# Patient Record
Sex: MALE | Race: ASIAN | Employment: FULL TIME | URBAN - METROPOLITAN AREA
[De-identification: names, ages, dates, MRNs, and addresses within clinical notes are randomized per-mention and may not be internally consistent; named-entity substitution may affect disease eponyms.]

---

## 2017-05-11 ENCOUNTER — OFFICE VISIT (OUTPATIENT)
Dept: INTERNAL MEDICINE CLINIC | Facility: CLINIC | Age: 37
End: 2017-05-11

## 2017-05-11 VITALS
HEART RATE: 87 BPM | DIASTOLIC BLOOD PRESSURE: 68 MMHG | SYSTOLIC BLOOD PRESSURE: 118 MMHG | BODY MASS INDEX: 21.9 KG/M2 | WEIGHT: 153 LBS | RESPIRATION RATE: 16 BRPM | HEIGHT: 70 IN

## 2017-05-11 DIAGNOSIS — M72.2 PLANTAR FASCIITIS: Primary | ICD-10-CM

## 2017-05-11 PROCEDURE — 99212 OFFICE O/P EST SF 10 MIN: CPT | Performed by: INTERNAL MEDICINE

## 2017-05-11 PROCEDURE — 99213 OFFICE O/P EST LOW 20 MIN: CPT | Performed by: INTERNAL MEDICINE

## 2017-05-11 RX ORDER — NAPROXEN 500 MG/1
500 TABLET ORAL 2 TIMES DAILY WITH MEALS
Qty: 30 TABLET | Refills: 1 | Status: SHIPPED | OUTPATIENT
Start: 2017-05-11 | End: 2018-05-06

## 2017-05-11 NOTE — PROGRESS NOTES
Christopher Brown is a 31year old male. Patient presents with:  Pain: right heel    HPI:   For the past 2 months, he has had persistent pain in the plantar aspect of his right heel. Pain is worse with walking, and he finds that he occasionally limps.   He recalls agrees to the plan. The patient is asked to return as needed.     Ellery Aschoff, MD  5/11/2017  9:20 AM

## 2017-05-11 NOTE — PATIENT INSTRUCTIONS
Please rest and apply ice to your right heel 3 times daily. Please perform gentle stretching exercises, and wear arch supports in your shoes. Please take naproxen 500 mg twice daily with meals. Call if not soon better.

## 2018-05-17 ENCOUNTER — OFFICE VISIT (OUTPATIENT)
Dept: INTERNAL MEDICINE CLINIC | Facility: CLINIC | Age: 38
End: 2018-05-17

## 2018-05-17 VITALS
SYSTOLIC BLOOD PRESSURE: 126 MMHG | HEART RATE: 83 BPM | HEIGHT: 70 IN | WEIGHT: 157.88 LBS | BODY MASS INDEX: 22.6 KG/M2 | DIASTOLIC BLOOD PRESSURE: 78 MMHG

## 2018-05-17 DIAGNOSIS — S16.1XXA STRAIN OF NECK MUSCLE, INITIAL ENCOUNTER: Primary | ICD-10-CM

## 2018-05-17 PROCEDURE — 99212 OFFICE O/P EST SF 10 MIN: CPT | Performed by: INTERNAL MEDICINE

## 2018-05-17 PROCEDURE — 99213 OFFICE O/P EST LOW 20 MIN: CPT | Performed by: INTERNAL MEDICINE

## 2018-05-17 RX ORDER — TRAMADOL HYDROCHLORIDE 50 MG/1
TABLET ORAL
Qty: 30 TABLET | Refills: 0 | Status: SHIPPED
Start: 2018-05-17 | End: 2018-11-07

## 2018-05-17 RX ORDER — COVID-19 ANTIGEN TEST
220 KIT MISCELLANEOUS EVERY EVENING
Qty: 60 CAPSULE | Refills: 0 | OUTPATIENT
Start: 2018-05-17 | End: 2018-06-16

## 2018-05-17 RX ORDER — CYCLOBENZAPRINE HCL 10 MG
10 TABLET ORAL 3 TIMES DAILY
Qty: 30 TABLET | Refills: 1 | Status: SHIPPED | OUTPATIENT
Start: 2018-05-17 | End: 2018-06-06

## 2018-05-17 NOTE — ASSESSMENT & PLAN NOTE
Continue Aleve daily. Add Flexeril. Tramadol prn. Neck exercises. PT if no improvement in 1 week. f/u in 2 weeks.

## 2018-05-17 NOTE — PATIENT INSTRUCTIONS
Take Aleve every evening. Take Flexeril if needed. Take tramadol only if the other 2 meds don't help. Murphys (Flexibility)    1. Lie on your back on the floor, with your knees bent and your feet flat on the floor.   2. Gently flatten your neck toward · Raise both of your shoulders as high as you can, as if you were trying to touch them to your ears. Keep your head and neck still and relaxed. · Hold for a count of 10. Release. · Repeat 5 times.   For your safety, check with your healthcare provider bef

## 2018-05-17 NOTE — PROGRESS NOTES
HPI:    Patient ID: Shaheen Lakhani is a 30year old male. Pt woke with neck/shoulder pain. Neck Pain    This is a new problem. The current episode started 1 to 4 weeks ago. The problem occurs daily. The problem has been waxing and waning.  The pain is pre well-developed and well-nourished. HENT:   Head: Normocephalic and atraumatic. Cardiovascular: Normal rate, regular rhythm and normal heart sounds. Pulmonary/Chest: Effort normal. No respiratory distress.    Musculoskeletal:        Cervical back: He

## 2018-11-04 ENCOUNTER — LAB SERVICES (OUTPATIENT)
Dept: OTHER | Age: 38
End: 2018-11-04

## 2018-11-04 LAB
ANALYZER ANC (IANC): ABNORMAL
ANALYZER ANC (IANC): ABNORMAL
ANION GAP SERPL CALC-SCNC: 12 MMOL/L (ref 10–20)
ANION GAP SERPL CALC-SCNC: 12 MMOL/L (ref 10–20)
BASOPHILS # BLD: 0 K/MCL (ref 0–0.3)
BASOPHILS # BLD: 0 THOUSAND/MCL (ref 0–0.3)
BASOPHILS NFR BLD: 0 %
BASOPHILS NFR BLD: 0 %
BUN SERPL-MCNC: 16 MG/DL (ref 6–20)
BUN SERPL-MCNC: 16 MG/DL (ref 6–20)
BUN/CREAT SERPL: 14 (ref 7–25)
BUN/CREAT SERPL: 14 (ref 7–25)
CALCIUM SERPL-MCNC: 8 MG/DL (ref 8.4–10.2)
CALCIUM SERPL-MCNC: 8 MG/DL (ref 8.4–10.2)
CHLORIDE SERPL-SCNC: 105 MMOL/L (ref 98–107)
CHLORIDE: 105 MMOL/L (ref 98–107)
CO2 SERPL-SCNC: 28 MMOL/L (ref 21–32)
CO2 SERPL-SCNC: 28 MMOL/L (ref 21–32)
CREAT SERPL-MCNC: 1.15 MG/DL (ref 0.67–1.17)
CREAT SERPL-MCNC: 1.15 MG/DL (ref 0.67–1.17)
D DIMER PPP FEU-MCNC: <0.19 MG/L (FEU)
D DIMER PPP FEU-MCNC: <0.19 MG/L FEU
DIFFERENTIAL METHOD BLD: ABNORMAL
DIFFERENTIAL METHOD BLD: ABNORMAL
EOSINOPHIL # BLD: 0.1 K/MCL (ref 0.1–0.5)
EOSINOPHIL # BLD: 0.1 THOUSAND/MCL (ref 0.1–0.5)
EOSINOPHIL NFR BLD: 1 %
EOSINOPHIL NFR BLD: 1 %
ERYTHROCYTE [DISTWIDTH] IN BLOOD: 12.6 % (ref 11–15)
ERYTHROCYTE [DISTWIDTH] IN BLOOD: 12.6 % (ref 11–15)
GLUCOSE SERPL-MCNC: 151 MG/DL (ref 65–99)
GLUCOSE SERPL-MCNC: 151 MG/DL (ref 65–99)
HEMATOCRIT: 48.4 % (ref 39–51)
HEMATOCRIT: 48.4 % (ref 39–51)
HEMOGLOBIN: 15.4 G/DL (ref 13–17)
HGB BLD-MCNC: 15.4 GM/DL (ref 13–17)
IMM GRANULOCYTES # BLD AUTO: 0 K/MCL (ref 0–0.2)
IMM GRANULOCYTES # BLD AUTO: 0 THOUSAND/MCL (ref 0–0.2)
IMM GRANULOCYTES NFR BLD: 0 %
IMM GRANULOCYTES NFR BLD: 0 %
LYMPHOCYTES # BLD: 2.6 K/MCL (ref 1–4.8)
LYMPHOCYTES # BLD: 2.6 THOUSAND/MCL (ref 1–4.8)
LYMPHOCYTES NFR BLD: 36 %
LYMPHOCYTES NFR BLD: 36 %
MCH RBC QN AUTO: 28.7 PG (ref 26–34)
MCHC RBC AUTO-ENTMCNC: 31.8 GM/DL (ref 32–36.5)
MCV RBC AUTO: 90.3 FL (ref 78–100)
MEAN CORPUSCULAR HEMOGLOBIN: 28.7 PG (ref 26–34)
MEAN CORPUSCULAR HGB CONC: 31.8 G/DL (ref 32–36.5)
MEAN CORPUSCULAR VOLUME: 90.3 FL (ref 78–100)
MONOCYTES # BLD: 0.8 K/MCL (ref 0.3–0.9)
MONOCYTES # BLD: 0.8 THOUSAND/MCL (ref 0.3–0.9)
MONOCYTES NFR BLD: 11 %
MONOCYTES NFR BLD: 11 %
NEUTROPHILS # BLD: 3.8 K/MCL (ref 1.8–7.7)
NEUTROPHILS # BLD: 3.8 THOUSAND/MCL (ref 1.8–7.7)
NEUTROPHILS NFR BLD: 52 %
NEUTROPHILS NFR BLD: 52 %
NEUTS SEG NFR BLD: ABNORMAL
NEUTS SEG NFR BLD: ABNORMAL %
NRBC (NRBCRE): 0 /100 WBC
NRBC (NRBCRE): 0 /100 WBC
PLATELET # BLD: 210 THOUSAND/MCL (ref 140–450)
PLATELET COUNT: 210 K/MCL (ref 140–450)
POTASSIUM SERPL-SCNC: 3.8 MMOL/L (ref 3.4–5.1)
POTASSIUM SERPL-SCNC: 3.8 MMOL/L (ref 3.4–5.1)
RBC # BLD: 5.36 MILLION/MCL (ref 4.5–5.9)
RED CELL COUNT: 5.36 MIL/MCL (ref 4.5–5.9)
SODIUM SERPL-SCNC: 141 MMOL/L (ref 135–145)
SODIUM SERPL-SCNC: 141 MMOL/L (ref 135–145)
T4 FREE SERPL-MCNC: 1.1 NG/DL (ref 0.8–1.5)
TROPONIN I SERPL HS-MCNC: <0.02 NG/ML
TROPONIN I SERPL HS-MCNC: <0.02 NG/ML
TSH SERPL-ACNC: 5.21 MCUNIT/ML (ref 0.35–5)
TSH SERPL-ACNC: 5.21 MCUNITS/ML (ref 0.35–5)
WBC # BLD: 7.3 THOUSAND/MCL (ref 4.2–11)
WHITE BLOOD COUNT: 7.3 K/MCL (ref 4.2–11)

## 2018-11-05 ENCOUNTER — HOSPITAL (OUTPATIENT)
Dept: OTHER | Age: 38
End: 2018-11-05
Attending: INTERNAL MEDICINE

## 2018-11-05 ENCOUNTER — IMAGING SERVICES (OUTPATIENT)
Dept: OTHER | Age: 38
End: 2018-11-05

## 2018-11-05 ENCOUNTER — DIAGNOSTIC TRANS (OUTPATIENT)
Dept: OTHER | Age: 38
End: 2018-11-05

## 2018-11-05 LAB
GLUCOSE BLDC GLUCOMTR-MCNC: 107 MG/DL (ref 65–99)
GLUCOSE BLDC GLUCOMTR-MCNC: 108 MG/DL (ref 65–99)
GLUCOSE BLDC GLUCOMTR-MCNC: 71 MG/DL (ref 65–99)
GLYCOHEMOGLOBIN: 5.3 % (ref 4.5–5.6)
T4 FREE SERPL-MCNC: 1.1 NG/DL (ref 0.8–1.5)

## 2018-11-07 ENCOUNTER — OFFICE VISIT (OUTPATIENT)
Dept: INTERNAL MEDICINE CLINIC | Facility: CLINIC | Age: 38
End: 2018-11-07
Payer: COMMERCIAL

## 2018-11-07 VITALS
HEART RATE: 90 BPM | WEIGHT: 155 LBS | DIASTOLIC BLOOD PRESSURE: 78 MMHG | RESPIRATION RATE: 18 BRPM | SYSTOLIC BLOOD PRESSURE: 124 MMHG | TEMPERATURE: 98 F | BODY MASS INDEX: 22.19 KG/M2 | HEIGHT: 70 IN

## 2018-11-07 DIAGNOSIS — R00.2 PALPITATIONS: Primary | ICD-10-CM

## 2018-11-07 PROCEDURE — 99213 OFFICE O/P EST LOW 20 MIN: CPT | Performed by: INTERNAL MEDICINE

## 2018-11-07 PROCEDURE — 1111F DSCHRG MED/CURRENT MED MERGE: CPT | Performed by: INTERNAL MEDICINE

## 2018-11-07 PROCEDURE — 99212 OFFICE O/P EST SF 10 MIN: CPT | Performed by: INTERNAL MEDICINE

## 2018-11-07 NOTE — PROGRESS NOTES
Asim Pantoja is a 30year old male. Patient presents with:  Hospital F/U    HPI:   Ming Fili presents this afternoon for hospital follow-up.     He was at a friend's house on November 4 when he had 3 distinct episodes of palpitations, feeling symptoms of flushing, Resp 18   Ht 5' 10\" (1.778 m)   Wt 155 lb (70.3 kg)   BMI 22.24 kg/m²   LUNGS: Resonant to percussion and clear to auscultation  CARDIAC: Rhythm regular S1 S2 normal without murmur, JVD or edema  ABDOMEN: Bowel sounds normal soft nontender      ASSESSMEN

## 2018-11-07 NOTE — PATIENT INSTRUCTIONS
Purchase a device to access deann for an event monitor on your smart phone and follow-up with Dr. Danny Mathew as advised

## 2018-11-29 ENCOUNTER — MED REC SCAN ONLY (OUTPATIENT)
Dept: INTERNAL MEDICINE CLINIC | Facility: CLINIC | Age: 38
End: 2018-11-29

## 2018-12-20 ENCOUNTER — MED REC SCAN ONLY (OUTPATIENT)
Dept: CARDIOLOGY CLINIC | Facility: CLINIC | Age: 38
End: 2018-12-20

## 2018-12-20 ENCOUNTER — OFFICE VISIT (OUTPATIENT)
Dept: CARDIOLOGY CLINIC | Facility: CLINIC | Age: 38
End: 2018-12-20
Payer: COMMERCIAL

## 2018-12-20 VITALS
WEIGHT: 149 LBS | HEIGHT: 70 IN | TEMPERATURE: 99 F | SYSTOLIC BLOOD PRESSURE: 128 MMHG | DIASTOLIC BLOOD PRESSURE: 90 MMHG | HEART RATE: 81 BPM | BODY MASS INDEX: 21.33 KG/M2

## 2018-12-20 DIAGNOSIS — I47.1 SVT (SUPRAVENTRICULAR TACHYCARDIA) (HCC): Primary | ICD-10-CM

## 2018-12-20 DIAGNOSIS — R42 DIZZINESS: ICD-10-CM

## 2018-12-20 PROBLEM — I47.10 SVT (SUPRAVENTRICULAR TACHYCARDIA) (HCC): Status: ACTIVE | Noted: 2018-12-20

## 2018-12-20 PROBLEM — I47.10 SVT (SUPRAVENTRICULAR TACHYCARDIA): Status: ACTIVE | Noted: 2018-12-20

## 2018-12-20 PROCEDURE — 99212 OFFICE O/P EST SF 10 MIN: CPT | Performed by: INTERNAL MEDICINE

## 2018-12-20 PROCEDURE — 99245 OFF/OP CONSLTJ NEW/EST HI 55: CPT | Performed by: INTERNAL MEDICINE

## 2018-12-20 NOTE — H&P
[facility]    Cardiac Electrophysiology Consultation  2018    Name:  Elvia Shepherd  : 1980    Date of consultation:   2018    Referring physician: Dr. Charissa Jordan    Reason for Consultation:  tachycardia    History of Present Illness:  Pura Britton Review of Systems:  GENERAL: no fevers, chills, sweats  HEENT: no visual or hearing changes  SKIN: denies any unusual skin lesions or rashes  RESPIRATORY: denies shortness of breath with exertion  CARDIOVASCULAR: no chest pain  GI: denies abdominal kervin 9.6 07/23/2015    OSMOCALC 288 07/23/2015    AST 24 07/23/2015    ALT 23 07/23/2015    ALKPHOS 54 07/23/2015    BILT 1.0 07/23/2015    TP 7.5 07/23/2015    ALB 4.3 07/23/2015    GLOBULIN 3.2 07/23/2015    AGRATIO 1.3 07/23/2015     07/23/2015    K 3.

## 2018-12-21 ENCOUNTER — TELEPHONE (OUTPATIENT)
Dept: CARDIOLOGY CLINIC | Facility: CLINIC | Age: 38
End: 2018-12-21

## 2018-12-21 DIAGNOSIS — I47.10 SVT (SUPRAVENTRICULAR TACHYCARDIA): Primary | ICD-10-CM

## 2018-12-21 DIAGNOSIS — R00.2 PALPITATION: ICD-10-CM

## 2018-12-21 NOTE — TELEPHONE ENCOUNTER
Pt calling to advise office that he would like to proceed with the ablasion procedure that was discussed in office yesterday, pls call at:[phone],thanks.

## 2018-12-21 NOTE — TELEPHONE ENCOUNTER
SVT Ablation by Dr. Sienna Morales 1/8, 1/11 or 1/14 TBD    D/t SVT, palpitations    Patient chose 1/8/19. Scheduled. Following up w/ Dr. Sienna Morales 1/3 re: anesthia and navigation.

## 2018-12-21 NOTE — TELEPHONE ENCOUNTER
Reviewed AG LOV. Called patient left brief message that we will call Wednesday to have ablation scheduled.

## 2018-12-24 NOTE — TELEPHONE ENCOUNTER
Patient scheduled for svt ablation 1-8-19 . Does patient need anesthesia? And do you need any navigation systems?   Please sign orders for pre admission testing

## 2018-12-24 NOTE — H&P
[facility]    Cardiac Electrophysiology H&P Addendum    Korey Hanley Location:Cath Lab Suites   [facility] [id number] MRN [id number]   Admission Date 1/8/2019 Procedure Date 1/8/2019   Attending Physician Stephanie Dutta MD Procedure Physician Lizzy Marino

## 2018-12-31 ENCOUNTER — TELEPHONE (OUTPATIENT)
Dept: CARDIOLOGY CLINIC | Facility: CLINIC | Age: 38
End: 2018-12-31

## 2018-12-31 ENCOUNTER — APPOINTMENT (OUTPATIENT)
Dept: LAB | Facility: HOSPITAL | Age: 38
End: 2018-12-31
Attending: NURSE PRACTITIONER
Payer: COMMERCIAL

## 2018-12-31 ENCOUNTER — HOSPITAL ENCOUNTER (OUTPATIENT)
Dept: GENERAL RADIOLOGY | Facility: HOSPITAL | Age: 38
Discharge: HOME | End: 2018-12-31
Attending: NURSE PRACTITIONER
Payer: COMMERCIAL

## 2018-12-31 DIAGNOSIS — R00.2 PALPITATION: ICD-10-CM

## 2018-12-31 DIAGNOSIS — I47.1 SVT (SUPRAVENTRICULAR TACHYCARDIA) (HCC): Primary | ICD-10-CM

## 2018-12-31 DIAGNOSIS — I47.1 SVT (SUPRAVENTRICULAR TACHYCARDIA) (HCC): ICD-10-CM

## 2018-12-31 LAB
ANION GAP SERPL CALC-SCNC: 9 MMOL/L (ref 0–18)
BUN SERPL-MCNC: 11 MG/DL (ref 8–20)
BUN/CREAT SERPL: 8.9 (ref 10–20)
CALCIUM SERPL-MCNC: 9.6 MG/DL (ref 8.5–10.5)
CHLORIDE SERPL-SCNC: 100 MMOL/L (ref 95–110)
CO2 SERPL-SCNC: 28 MMOL/L (ref 22–32)
CREAT SERPL-MCNC: 1.24 MG/DL (ref 0.5–1.5)
ERYTHROCYTE [DISTWIDTH] IN BLOOD BY AUTOMATED COUNT: 13 % (ref 11–15)
GLUCOSE SERPL-MCNC: 86 MG/DL (ref 70–99)
HCT VFR BLD AUTO: 46.5 % (ref 41–52)
HGB BLD-MCNC: 15.1 G/DL (ref 13.5–17.5)
MAGNESIUM SERPL-MCNC: 2.1 MG/DL (ref 1.8–2.5)
MCH RBC QN AUTO: 28.9 PG (ref 27–32)
MCHC RBC AUTO-ENTMCNC: 32.5 G/DL (ref 32–37)
MCV RBC AUTO: 88.8 FL (ref 80–100)
OSMOLALITY UR CALC.SUM OF ELEC: 283 MOSM/KG (ref 275–295)
PLATELET # BLD AUTO: 199 K/UL (ref 140–400)
PMV BLD AUTO: 8.9 FL (ref 7.4–10.3)
POTASSIUM SERPL-SCNC: 4.2 MMOL/L (ref 3.3–5.1)
RBC # BLD AUTO: 5.24 M/UL (ref 4.5–5.9)
SODIUM SERPL-SCNC: 137 MMOL/L (ref 136–144)
WBC # BLD AUTO: 4.5 K/UL (ref 4–11)

## 2018-12-31 PROCEDURE — 80048 BASIC METABOLIC PNL TOTAL CA: CPT | Performed by: NURSE PRACTITIONER

## 2018-12-31 PROCEDURE — 93010 ELECTROCARDIOGRAM REPORT: CPT | Performed by: NURSE PRACTITIONER

## 2018-12-31 PROCEDURE — 83735 ASSAY OF MAGNESIUM: CPT | Performed by: NURSE PRACTITIONER

## 2018-12-31 PROCEDURE — 36415 COLL VENOUS BLD VENIPUNCTURE: CPT | Performed by: NURSE PRACTITIONER

## 2018-12-31 PROCEDURE — 71046 X-RAY EXAM CHEST 2 VIEWS: CPT | Performed by: NURSE PRACTITIONER

## 2018-12-31 PROCEDURE — 93005 ELECTROCARDIOGRAM TRACING: CPT

## 2018-12-31 PROCEDURE — 85027 COMPLETE CBC AUTOMATED: CPT | Performed by: NURSE PRACTITIONER

## 2018-12-31 NOTE — TELEPHONE ENCOUNTER
S/w Magdalene Godfrey, states he wanted Dr. Nahid Rain to know he was still having SVT, even went to the ER at [facility] in [place]. Reviewed visit note from 'dr. Nahid Rain, no mention of medication options,  Ablation planned for 1/8/19.  Patient continues to do vagal luciana

## 2018-12-31 NOTE — TELEPHONE ENCOUNTER
Pt calling to advise that all last week he had at least one SVT episode a day, asking if he should be put on any rx prior to yarely procedure Jan 8, pls call at:[phone],thanks.

## 2019-01-02 NOTE — TELEPHONE ENCOUNTER
Ablation 1/8/19, per [facility] automated phone call no prior auth needed for out pt  Services. Reference #[id number]. Unable to verify on line as problem with site.

## 2019-01-03 NOTE — TELEPHONE ENCOUNTER
Reviewed with Dr. Dominic Victoria. He does not want to order any medication for him, nothing further until Ablation scheduled for 1/8.

## 2019-01-04 NOTE — TELEPHONE ENCOUNTER
On hold for 25 mins. S/w milena at [facility], no prior auth needed. Ref # [id number]. S/W Dennis Ibarra and informed of above.

## 2019-01-08 ENCOUNTER — HOSPITAL ENCOUNTER (OUTPATIENT)
Dept: INTERVENTIONAL RADIOLOGY/VASCULAR | Facility: HOSPITAL | Age: 39
Discharge: HOME | End: 2019-01-08
Attending: INTERNAL MEDICINE | Admitting: INTERNAL MEDICINE
Payer: COMMERCIAL

## 2019-01-08 VITALS
SYSTOLIC BLOOD PRESSURE: 117 MMHG | RESPIRATION RATE: 16 BRPM | HEART RATE: 68 BPM | BODY MASS INDEX: 20.76 KG/M2 | HEIGHT: 70 IN | OXYGEN SATURATION: 99 % | WEIGHT: 145 LBS | DIASTOLIC BLOOD PRESSURE: 72 MMHG

## 2019-01-08 DIAGNOSIS — I47.1 SVT (SUPRAVENTRICULAR TACHYCARDIA) (HCC): ICD-10-CM

## 2019-01-08 DIAGNOSIS — R00.2 PALPITATIONS: ICD-10-CM

## 2019-01-08 PROCEDURE — 93623 PRGRMD STIMJ&PACG IV RX NFS: CPT

## 2019-01-08 PROCEDURE — 02K83ZZ MAP CONDUCTION MECHANISM, PERCUTANEOUS APPROACH: ICD-10-PCS | Performed by: INTERNAL MEDICINE

## 2019-01-08 PROCEDURE — 36415 COLL VENOUS BLD VENIPUNCTURE: CPT

## 2019-01-08 PROCEDURE — 99152 MOD SED SAME PHYS/QHP 5/>YRS: CPT

## 2019-01-08 PROCEDURE — 99153 MOD SED SAME PHYS/QHP EA: CPT

## 2019-01-08 PROCEDURE — 93620 COMP EP EVL R AT VEN PAC&REC: CPT

## 2019-01-08 PROCEDURE — 93609 INTRA-VNTR MAPG TCHYCAR SITE: CPT

## 2019-01-08 PROCEDURE — 4A0234Z MEASUREMENT OF CARDIAC ELECTRICAL ACTIVITY, PERCUTANEOUS APPROACH: ICD-10-PCS | Performed by: INTERNAL MEDICINE

## 2019-01-08 PROCEDURE — 4A023FZ MEASUREMENT OF CARDIAC RHYTHM, PERCUTANEOUS APPROACH: ICD-10-PCS | Performed by: INTERNAL MEDICINE

## 2019-01-08 PROCEDURE — 96360 HYDRATION IV INFUSION INIT: CPT

## 2019-01-08 RX ORDER — SODIUM CHLORIDE 0.9 % (FLUSH) 0.9 %
10 SYRINGE (ML) INJECTION AS NEEDED
Status: DISCONTINUED | OUTPATIENT
Start: 2019-01-08 | End: 2019-01-08

## 2019-01-08 RX ORDER — LIDOCAINE HYDROCHLORIDE 20 MG/ML
INJECTION, SOLUTION EPIDURAL; INFILTRATION; INTRACAUDAL; PERINEURAL
Status: COMPLETED
Start: 2019-01-08 | End: 2019-01-08

## 2019-01-08 RX ORDER — SODIUM CHLORIDE 9 MG/ML
INJECTION, SOLUTION INTRAVENOUS
Status: DISCONTINUED
Start: 2019-01-08 | End: 2019-01-08

## 2019-01-08 RX ORDER — ACETAMINOPHEN AND CODEINE PHOSPHATE 300; 30 MG/1; MG/1
2 TABLET ORAL EVERY 4 HOURS PRN
Status: DISCONTINUED | OUTPATIENT
Start: 2019-01-08 | End: 2019-01-08

## 2019-01-08 RX ORDER — MIDAZOLAM HYDROCHLORIDE 1 MG/ML
INJECTION INTRAMUSCULAR; INTRAVENOUS
Status: COMPLETED
Start: 2019-01-08 | End: 2019-01-08

## 2019-01-08 RX ORDER — HEPARIN SODIUM 1000 [USP'U]/ML
INJECTION, SOLUTION INTRAVENOUS; SUBCUTANEOUS
Status: COMPLETED
Start: 2019-01-08 | End: 2019-01-08

## 2019-01-08 RX ORDER — ISOPROTERENOL HYDROCHLORIDE 0.2 MG/ML
INJECTION, SOLUTION INTRAMUSCULAR; INTRAVENOUS
Status: COMPLETED
Start: 2019-01-08 | End: 2019-01-08

## 2019-01-08 RX ORDER — SODIUM CHLORIDE 9 MG/ML
INJECTION, SOLUTION INTRAVENOUS CONTINUOUS
Status: DISCONTINUED | OUTPATIENT
Start: 2019-01-08 | End: 2019-01-08

## 2019-01-08 RX ORDER — ACETAMINOPHEN AND CODEINE PHOSPHATE 300; 30 MG/1; MG/1
1 TABLET ORAL EVERY 4 HOURS PRN
Status: DISCONTINUED | OUTPATIENT
Start: 2019-01-08 | End: 2019-01-08

## 2019-01-08 RX ORDER — ACETAMINOPHEN 325 MG/1
650 TABLET ORAL EVERY 4 HOURS PRN
Status: DISCONTINUED | OUTPATIENT
Start: 2019-01-08 | End: 2019-01-08

## 2019-01-08 RX ORDER — SODIUM CHLORIDE 9 MG/ML
INJECTION, SOLUTION INTRAVENOUS
Status: COMPLETED
Start: 2019-01-08 | End: 2019-01-08

## 2019-01-08 NOTE — PROCEDURES
[facility]    Cardiac Electrophysiology Procedure Note    Ruthann Quiñonez Location:Cath Lab Suites   [facility] [id number] MRN [id number]   Admission Date 1/8/2019 Procedure Date 1/8/2019   Attending Physician Андрей Pearson MD Procedure Physician Lizzy Remy was observed, with no echo beats. Sinus node function assessed with pacing cycle lengths of 600 and 500, with longest corrected sinus node recovery time 270 ms. The study was repeated on 2-5 mcg of isoproterenol, and during washout.  Sinus cycle length 660

## 2019-01-09 ENCOUNTER — PATIENT MESSAGE (OUTPATIENT)
Dept: CARDIOLOGY CLINIC | Facility: CLINIC | Age: 39
End: 2019-01-09

## 2019-01-09 NOTE — PROGRESS NOTES
Pt is able to sit up and ambulate without difficulty. Procedural access site is dry and intact with no signs and symptoms of bleeding and hematoma. Instructions provided. Pt/family verbalized understanding.

## 2019-01-10 NOTE — TELEPHONE ENCOUNTER
From: Stacia Zheng Au  To: Charly Long MD  Sent: 1/9/2019 10:09 AM CST  Subject: Non-Urgent Medical Question    I know you informed to me yesterday after my procedure the condition I have. I forgot what you called it.  Can you tell me the medical term again of

## 2019-01-14 ENCOUNTER — OFFICE VISIT (OUTPATIENT)
Dept: CARDIOLOGY CLINIC | Facility: CLINIC | Age: 39
End: 2019-01-14
Payer: COMMERCIAL

## 2019-01-14 VITALS
HEIGHT: 70 IN | HEART RATE: 83 BPM | DIASTOLIC BLOOD PRESSURE: 86 MMHG | BODY MASS INDEX: 20.47 KG/M2 | WEIGHT: 143 LBS | SYSTOLIC BLOOD PRESSURE: 122 MMHG

## 2019-01-14 DIAGNOSIS — R00.0 SINUS TACHYCARDIA: Primary | ICD-10-CM

## 2019-01-14 PROCEDURE — 99212 OFFICE O/P EST SF 10 MIN: CPT | Performed by: INTERNAL MEDICINE

## 2019-01-14 PROCEDURE — 99213 OFFICE O/P EST LOW 20 MIN: CPT | Performed by: INTERNAL MEDICINE

## 2019-01-14 NOTE — PATIENT INSTRUCTIONS
- maintain about 2.5 L of noncaffeinated/nonalcoholic fluid intake daily  - perform 30-45 minutes of light aerobic exercise daily  - if any new heart or rhythm concerns, see Dr Lu Guy

## 2019-01-14 NOTE — PROGRESS NOTES
[facility]    Cardiac Electrophysiology Progress Note  1/14/2019      HPI:   Luisa Mason is a 41year old here to follow-up his palpitations and a recent EP study.   In the EP laboratory, we identified sinus tachycardia and what is likely sinus node reent TP 7.5 07/23/2015    ALB 4.3 07/23/2015    GLOBULIN 3.2 07/23/2015    AGRATIO 1.3 07/23/2015     12/31/2018    K 4.2 12/31/2018     12/31/2018    CO2 28 12/31/2018     No results found for: T4F, TSH, TSHT4  Lab Results   Component Value Date approach, if any new heart or rhythm concerns arise in the future he should see me.       Mickie Garcia MD  Cardiology/Cardiac EP  1/14/2019

## 2019-02-27 ENCOUNTER — OFFICE VISIT (OUTPATIENT)
Dept: INTERNAL MEDICINE CLINIC | Facility: CLINIC | Age: 39
End: 2019-02-27
Payer: COMMERCIAL

## 2019-02-27 ENCOUNTER — APPOINTMENT (OUTPATIENT)
Dept: LAB | Facility: HOSPITAL | Age: 39
End: 2019-02-27
Attending: INTERNAL MEDICINE
Payer: COMMERCIAL

## 2019-02-27 VITALS
TEMPERATURE: 98 F | HEART RATE: 70 BPM | WEIGHT: 137.69 LBS | BODY MASS INDEX: 19.71 KG/M2 | SYSTOLIC BLOOD PRESSURE: 118 MMHG | DIASTOLIC BLOOD PRESSURE: 77 MMHG | RESPIRATION RATE: 16 BRPM | HEIGHT: 70 IN

## 2019-02-27 DIAGNOSIS — Z00.00 ANNUAL PHYSICAL EXAM: ICD-10-CM

## 2019-02-27 DIAGNOSIS — Z00.00 ANNUAL PHYSICAL EXAM: Primary | ICD-10-CM

## 2019-02-27 LAB
ALBUMIN SERPL-MCNC: 4.4 G/DL (ref 3.4–5)
ALBUMIN/GLOB SERPL: 1.3 {RATIO} (ref 1–2)
ALP LIVER SERPL-CCNC: 55 U/L (ref 45–117)
ALT SERPL-CCNC: 24 U/L (ref 16–61)
ANION GAP SERPL CALC-SCNC: 6 MMOL/L (ref 0–18)
AST SERPL-CCNC: 14 U/L (ref 15–37)
BILIRUB SERPL-MCNC: 0.9 MG/DL (ref 0.1–2)
BUN BLD-MCNC: 12 MG/DL (ref 7–18)
BUN/CREAT SERPL: 10.9 (ref 10–20)
CALCIUM BLD-MCNC: 9.3 MG/DL (ref 8.5–10.1)
CHLORIDE SERPL-SCNC: 105 MMOL/L (ref 98–107)
CHOLEST SMN-MCNC: 140 MG/DL (ref ?–200)
CO2 SERPL-SCNC: 29 MMOL/L (ref 21–32)
CREAT BLD-MCNC: 1.1 MG/DL (ref 0.7–1.3)
GLOBULIN PLAS-MCNC: 3.4 G/DL (ref 2.8–4.4)
GLUCOSE BLD-MCNC: 85 MG/DL (ref 70–99)
HDLC SERPL-MCNC: 54 MG/DL (ref 40–59)
LDLC SERPL CALC-MCNC: 75 MG/DL (ref ?–100)
M PROTEIN MFR SERPL ELPH: 7.8 G/DL (ref 6.4–8.2)
NONHDLC SERPL-MCNC: 86 MG/DL (ref ?–130)
OSMOLALITY SERPL CALC.SUM OF ELEC: 289 MOSM/KG (ref 275–295)
POTASSIUM SERPL-SCNC: 4.2 MMOL/L (ref 3.5–5.1)
SODIUM SERPL-SCNC: 140 MMOL/L (ref 136–145)
TRIGL SERPL-MCNC: 54 MG/DL (ref 30–149)
TSI SER-ACNC: 1.9 MIU/ML (ref 0.36–3.74)
VLDLC SERPL CALC-MCNC: 11 MG/DL (ref 0–30)

## 2019-02-27 PROCEDURE — 36415 COLL VENOUS BLD VENIPUNCTURE: CPT

## 2019-02-27 PROCEDURE — 99395 PREV VISIT EST AGE 18-39: CPT | Performed by: INTERNAL MEDICINE

## 2019-02-27 PROCEDURE — 80053 COMPREHEN METABOLIC PANEL: CPT

## 2019-02-27 PROCEDURE — 80061 LIPID PANEL: CPT

## 2019-02-27 PROCEDURE — 84443 ASSAY THYROID STIM HORMONE: CPT

## 2019-02-27 NOTE — PATIENT INSTRUCTIONS
Await results of today's blood tests. Continue to follow a healthy diet but increase calorie intake to regain weight. Slowly resume regular exercise.

## 2019-02-27 NOTE — H&P
Randy Mathew is a 41year old male who presents for a complete physical exam.   HPI:   After an EP study in January, presumed SVT was reclassified as sinus tachycardia with possible sinus reentry. Ablation was not attempted.   Modifying lifestyle factors and p Aunt    • Hypertension Paternal Grandfather       Social History:  Social History    Tobacco Use      Smoking status: Never Smoker      Smokeless tobacco: Never Used    Alcohol use: No      Frequency: Never    Drug use: No          REVIEW OF SYSTEMS:   GEN PM

## 2019-03-05 ENCOUNTER — MED REC SCAN ONLY (OUTPATIENT)
Dept: INTERNAL MEDICINE CLINIC | Facility: CLINIC | Age: 39
End: 2019-03-05

## 2019-04-29 ENCOUNTER — OFFICE VISIT (OUTPATIENT)
Dept: INTERNAL MEDICINE CLINIC | Facility: CLINIC | Age: 39
End: 2019-04-29
Payer: COMMERCIAL

## 2019-04-29 VITALS
RESPIRATION RATE: 16 BRPM | HEART RATE: 87 BPM | BODY MASS INDEX: 19.43 KG/M2 | SYSTOLIC BLOOD PRESSURE: 114 MMHG | TEMPERATURE: 99 F | HEIGHT: 70 IN | WEIGHT: 135.69 LBS | DIASTOLIC BLOOD PRESSURE: 78 MMHG

## 2019-04-29 DIAGNOSIS — H81.10 BENIGN PAROXYSMAL POSITIONAL VERTIGO, UNSPECIFIED LATERALITY: Primary | ICD-10-CM

## 2019-04-29 PROCEDURE — 99212 OFFICE O/P EST SF 10 MIN: CPT | Performed by: INTERNAL MEDICINE

## 2019-04-29 PROCEDURE — 99213 OFFICE O/P EST LOW 20 MIN: CPT | Performed by: INTERNAL MEDICINE

## 2019-04-29 RX ORDER — MECLIZINE HYDROCHLORIDE 25 MG/1
25 TABLET ORAL 3 TIMES DAILY PRN
Qty: 20 TABLET | Refills: 0 | Status: SHIPPED | OUTPATIENT
Start: 2019-04-29 | End: 2020-09-11

## 2019-04-29 NOTE — PATIENT INSTRUCTIONS
Please avoid sudden head movements and position changes for now. Use meclizine 25 mg 3 times daily as needed, watching for drowsiness. Call if no better.

## 2019-04-29 NOTE — PROGRESS NOTES
Royal Pendleton is a 41year old male. Patient presents with:  Dizziness: intermittant, x4 days  Ear Problem: feeel full  Other: intermittant balance issues    HPI:   He awoke from sleep on Friday morning, 3 days ago, and tried to get out of bed.   He had sudden v regular S1 S2 normal without murmur   ABDOMEN: Bowel sounds normal soft nontender  NEURO: Mental status grossly normal.  Cranial nerves normal.  Strength 5/5 bilateral upper and bilateral lower extremities without weakness.   Cerebellar function intact to f

## 2019-05-16 ENCOUNTER — OFFICE VISIT (OUTPATIENT)
Dept: OTOLARYNGOLOGY | Facility: CLINIC | Age: 39
End: 2019-05-16
Payer: COMMERCIAL

## 2019-05-16 ENCOUNTER — OFFICE VISIT (OUTPATIENT)
Dept: AUDIOLOGY | Facility: CLINIC | Age: 39
End: 2019-05-16
Payer: COMMERCIAL

## 2019-05-16 VITALS
SYSTOLIC BLOOD PRESSURE: 126 MMHG | WEIGHT: 133 LBS | DIASTOLIC BLOOD PRESSURE: 89 MMHG | HEIGHT: 70 IN | TEMPERATURE: 98 F | BODY MASS INDEX: 19.04 KG/M2

## 2019-05-16 DIAGNOSIS — R42 DIZZINESS: Primary | ICD-10-CM

## 2019-05-16 PROCEDURE — 99243 OFF/OP CNSLTJ NEW/EST LOW 30: CPT | Performed by: OTOLARYNGOLOGY

## 2019-05-16 PROCEDURE — 92557 COMPREHENSIVE HEARING TEST: CPT | Performed by: AUDIOLOGIST

## 2019-05-16 PROCEDURE — 99212 OFFICE O/P EST SF 10 MIN: CPT | Performed by: OTOLARYNGOLOGY

## 2019-05-16 PROCEDURE — 92570 ACOUSTIC IMMITANCE TESTING: CPT | Performed by: AUDIOLOGIST

## 2019-05-17 NOTE — PROGRESS NOTES
Parminder Brown is a 41year old male. Patient presents with:  Dizziness: started 3 weeks ago, c/o imbalance issues daily    HPI:   About 3 weeks ago he started having problems with dizziness.   He describes a spinning sensation mainly when he is lying down or bradford Submandibular. Anterior cervical. Posterior cervical. Supraclavicular.    Eyes Normal Conjunctiva - Right: Normal, Left: Normal. Pupil - Right: Normal, Left: Normal.    Ears Normal Inspection - Right: Normal, Left: Normal. Canal - Left: Normal. TM - Right:

## 2019-05-17 NOTE — PROGRESS NOTES
AUDIOLOGY REPORT      Celine Doyle is a 41year old male     Referring Provider: Chencho Cortes   YOB: 1980  Medical Record: [id number]      Patient Hearing History:   Patient reported dizziness. He does not report ear difference in hearing .

## 2019-06-18 ENCOUNTER — OFFICE VISIT (OUTPATIENT)
Dept: OTOLARYNGOLOGY | Facility: CLINIC | Age: 39
End: 2019-06-18
Payer: COMMERCIAL

## 2019-06-18 VITALS — SYSTOLIC BLOOD PRESSURE: 118 MMHG | DIASTOLIC BLOOD PRESSURE: 80 MMHG | TEMPERATURE: 97 F

## 2019-06-18 DIAGNOSIS — R42 DIZZINESS: Primary | ICD-10-CM

## 2019-06-18 PROCEDURE — 99212 OFFICE O/P EST SF 10 MIN: CPT | Performed by: OTOLARYNGOLOGY

## 2019-06-18 PROCEDURE — 99213 OFFICE O/P EST LOW 20 MIN: CPT | Performed by: OTOLARYNGOLOGY

## 2019-06-20 ENCOUNTER — OFFICE VISIT (OUTPATIENT)
Dept: PHYSICAL THERAPY | Facility: HOSPITAL | Age: 39
End: 2019-06-20
Attending: OTOLARYNGOLOGY
Payer: COMMERCIAL

## 2019-06-20 DIAGNOSIS — R42 DIZZINESS: ICD-10-CM

## 2019-06-20 PROCEDURE — 97161 PT EVAL LOW COMPLEX 20 MIN: CPT

## 2019-06-20 PROCEDURE — 95992 CANALITH REPOSITIONING PROC: CPT

## 2019-06-20 NOTE — PROGRESS NOTES
PHYSICAL THERAPY EVALUATION:   Referring Physician: Dr. Wilmer Domingo V  Diagnosis: BPPV      Date of Onset: per HPI Date of Service: 6/20/2019        PATIENT SUMMARY   Luisa Mason is a 41year old y/o male who presents to therapy today with reports of dizziness p impairments to resolve BPPV and improve tolerance to functional changes in position. Precautions:  None      OBJECTIVE:   Physical Exam:  Posture/Observation: pt is pleasant 40 y/o M; NAD.                     Neuro Screen:    Sensation: Denies numbness Patient/family education, Balance training, Canalith Repositioning Maneuver, Eye/head coordination exercises, Sensory organization training, Optokinetic stimulation.      Education or treatment limitation: None  Rehab Potential: good    Patient was advised

## 2019-06-20 NOTE — PROGRESS NOTES
Parminder Brown is a 41year old male.  Patient presents with:  Ear Problem: dizziness still the same,ear pressure    HPI:   He reports that his dizziness has improved slightly but he continues to have some problems with balance when he is changing positions and g Left: Normal. TM - Right: Normal, Left: Normal.     ASSESSMENT AND PLAN:   1. Dizziness  He has persistent problems with positional vertigo. His symptoms have improved.   I do suspect there is an allergic contribution but I also recommended physical therap

## 2019-06-24 ENCOUNTER — APPOINTMENT (OUTPATIENT)
Dept: PHYSICAL THERAPY | Facility: HOSPITAL | Age: 39
End: 2019-06-24
Attending: OTOLARYNGOLOGY
Payer: COMMERCIAL

## 2019-06-27 ENCOUNTER — OFFICE VISIT (OUTPATIENT)
Dept: PHYSICAL THERAPY | Facility: HOSPITAL | Age: 39
End: 2019-06-27
Attending: OTOLARYNGOLOGY
Payer: COMMERCIAL

## 2019-06-27 PROCEDURE — 95992 CANALITH REPOSITIONING PROC: CPT

## 2019-06-27 NOTE — PROGRESS NOTES
Diagnosis: BPPV  Visit (# authorized):  5 per POC ([employer] PPO)                      Referring Physician:  Dr Love Pandya     Precautions: none     Medication changes since last visit?:  No    Subjective: Pt reports feeling good day after last treatment.  Severe sym

## 2019-07-02 ENCOUNTER — OFFICE VISIT (OUTPATIENT)
Dept: PHYSICAL THERAPY | Facility: HOSPITAL | Age: 39
End: 2019-07-02
Attending: OTOLARYNGOLOGY
Payer: COMMERCIAL

## 2019-07-02 PROCEDURE — 95992 CANALITH REPOSITIONING PROC: CPT

## 2019-07-02 NOTE — PROGRESS NOTES
Diagnosis: BPPV  Visit (# authorized):  5 per POC ([employer] PPO)                      Referring Physician:  Dr Odalis Garcia     Precautions: none     Medication changes since last visit?:  No    Subjective: Pt reports feeling better overall.  Intermittent feeling of \

## 2019-07-09 ENCOUNTER — APPOINTMENT (OUTPATIENT)
Dept: PHYSICAL THERAPY | Facility: HOSPITAL | Age: 39
End: 2019-07-09
Attending: OTOLARYNGOLOGY
Payer: COMMERCIAL

## 2019-07-10 ENCOUNTER — OFFICE VISIT (OUTPATIENT)
Dept: PHYSICAL THERAPY | Facility: HOSPITAL | Age: 39
End: 2019-07-10
Attending: OTOLARYNGOLOGY
Payer: COMMERCIAL

## 2019-07-10 PROCEDURE — 97112 NEUROMUSCULAR REEDUCATION: CPT

## 2019-07-10 NOTE — PROGRESS NOTES
Discharge Summary    Referring Physician: Dr Mariaa Pulido     Diagnosis: BPPV    Pt has attended 4 visits in Physical Therapy. Subjective: Sx's have resolved asymptomatic since last treatment. Assessment: BPPV resolved. No residual symptoms.  Pt is to be d

## 2019-07-16 ENCOUNTER — APPOINTMENT (OUTPATIENT)
Dept: PHYSICAL THERAPY | Facility: HOSPITAL | Age: 39
End: 2019-07-16
Attending: OTOLARYNGOLOGY
Payer: COMMERCIAL

## 2019-07-19 ENCOUNTER — APPOINTMENT (OUTPATIENT)
Dept: PHYSICAL THERAPY | Facility: HOSPITAL | Age: 39
End: 2019-07-19
Attending: OTOLARYNGOLOGY
Payer: COMMERCIAL

## 2019-09-06 ENCOUNTER — APPOINTMENT (OUTPATIENT)
Dept: OTHER | Facility: HOSPITAL | Age: 39
End: 2019-09-06
Attending: EMERGENCY MEDICINE

## 2020-03-31 ENCOUNTER — NURSE TRIAGE (OUTPATIENT)
Dept: INTERNAL MEDICINE CLINIC | Facility: CLINIC | Age: 40
End: 2020-03-31

## 2020-03-31 NOTE — TELEPHONE ENCOUNTER
Patient is requesting appointment via [employer] for \"random increase in heart rate\"    Please advise

## 2020-04-02 ENCOUNTER — NURSE ONLY (OUTPATIENT)
Dept: CARDIOLOGY CLINIC | Facility: CLINIC | Age: 40
End: 2020-04-02
Payer: COMMERCIAL

## 2020-04-02 ENCOUNTER — OFFICE VISIT (OUTPATIENT)
Dept: INTERNAL MEDICINE CLINIC | Facility: CLINIC | Age: 40
End: 2020-04-02
Payer: COMMERCIAL

## 2020-04-02 ENCOUNTER — LAB ENCOUNTER (OUTPATIENT)
Dept: LAB | Facility: HOSPITAL | Age: 40
End: 2020-04-02
Attending: INTERNAL MEDICINE
Payer: COMMERCIAL

## 2020-04-02 VITALS
BODY MASS INDEX: 19.33 KG/M2 | DIASTOLIC BLOOD PRESSURE: 86 MMHG | WEIGHT: 135 LBS | HEIGHT: 70 IN | SYSTOLIC BLOOD PRESSURE: 128 MMHG | HEART RATE: 84 BPM | TEMPERATURE: 99 F | RESPIRATION RATE: 20 BRPM

## 2020-04-02 DIAGNOSIS — R00.0 SINUS TACHYCARDIA: Primary | ICD-10-CM

## 2020-04-02 DIAGNOSIS — R00.0 SINUS TACHYCARDIA: ICD-10-CM

## 2020-04-02 PROCEDURE — 93270 REMOTE 30 DAY ECG REV/REPORT: CPT | Performed by: INTERNAL MEDICINE

## 2020-04-02 PROCEDURE — 80053 COMPREHEN METABOLIC PANEL: CPT

## 2020-04-02 PROCEDURE — 82306 VITAMIN D 25 HYDROXY: CPT

## 2020-04-02 PROCEDURE — 84443 ASSAY THYROID STIM HORMONE: CPT

## 2020-04-02 PROCEDURE — 84439 ASSAY OF FREE THYROXINE: CPT

## 2020-04-02 PROCEDURE — 84260 ASSAY OF SEROTONIN: CPT

## 2020-04-02 PROCEDURE — 99214 OFFICE O/P EST MOD 30 MIN: CPT | Performed by: INTERNAL MEDICINE

## 2020-04-02 PROCEDURE — 82533 TOTAL CORTISOL: CPT

## 2020-04-02 PROCEDURE — 85025 COMPLETE CBC W/AUTO DIFF WBC: CPT

## 2020-04-02 PROCEDURE — 82607 VITAMIN B-12: CPT

## 2020-04-02 PROCEDURE — 83735 ASSAY OF MAGNESIUM: CPT

## 2020-04-02 PROCEDURE — 84481 FREE ASSAY (FT-3): CPT

## 2020-04-02 PROCEDURE — 36415 COLL VENOUS BLD VENIPUNCTURE: CPT

## 2020-04-02 RX ORDER — PANTOPRAZOLE SODIUM 40 MG/1
40 TABLET, DELAYED RELEASE ORAL
Qty: 15 TABLET | Refills: 0 | Status: SHIPPED | OUTPATIENT
Start: 2020-04-02 | End: 2020-09-11

## 2020-04-02 NOTE — PATIENT INSTRUCTIONS
Problem List Items Addressed This Visit        Unprioritized    Sinus tachycardia - Primary     Intermittent but persistent episodes of palpitations, last episode on Sunday without any specific triggers. Lasted for about 30 minutes.   Heart rate running ar

## 2020-04-02 NOTE — PROGRESS NOTES
HPI:    Patient ID: Blanca Flores is a 42year old male. 11/2018 2 d echo from [address]:    1. Left ventricle: The cavity size is normal. Wall thickness is normal.     Systolic function is normal by visual assessment. nonalcoholic beverages daily, salt up to 2 g daily, avoiding stimulants such as over-the-counter decongestants, and also avoiding other recognized triggers.   I do not recommend medical therapy with rate-modulating agents, as these likely will make him feel External ear normal.   Nose: Nose normal.   Mouth/Throat: Oropharynx is clear and moist. No oropharyngeal exudate. Eyes: Pupils are equal, round, and reactive to light. Conjunctivae and EOM are normal.   Neck: Normal range of motion. Neck supple.  No JVD days–patient triggered will be requested. Labs to rule out other causes for these intermittent palpitations–sinus tachycardia. Pantoprazole as directed for 2 weeks.   Advised to substitute some electrolytes instead of plain water to ensure maintenance of

## 2020-04-02 NOTE — ASSESSMENT & PLAN NOTE
Intermittent but persistent episodes of palpitations, last episode on Sunday without any specific triggers. Lasted for about 30 minutes. Heart rate running around 100s. Has been having some abdominal discomfort, flushing, need to eat.   No history of gas

## 2020-05-19 ENCOUNTER — APPOINTMENT (OUTPATIENT)
Dept: CARDIOLOGY CLINIC | Facility: CLINIC | Age: 40
End: 2020-05-19
Payer: COMMERCIAL

## 2020-05-19 DIAGNOSIS — R00.0 SINUS TACHYCARDIA: ICD-10-CM

## 2020-05-22 PROCEDURE — 93272 ECG/REVIEW INTERPRET ONLY: CPT | Performed by: INTERNAL MEDICINE

## 2020-06-24 ENCOUNTER — OCC HEALTH (OUTPATIENT)
Dept: OTHER | Facility: HOSPITAL | Age: 40
End: 2020-06-24
Attending: FAMILY MEDICINE

## 2020-06-24 DIAGNOSIS — Z77.21 EXPOSURE TO BLOOD-BORNE PATHOGEN: Primary | ICD-10-CM

## 2020-06-24 PROCEDURE — 87389 HIV-1 AG W/HIV-1&-2 AB AG IA: CPT

## 2020-06-24 PROCEDURE — 86706 HEP B SURFACE ANTIBODY: CPT

## 2020-06-24 PROCEDURE — 86803 HEPATITIS C AB TEST: CPT

## 2020-07-09 DIAGNOSIS — Z78.9 PARTICIPANT IN HEALTH AND WELLNESS PLAN: Primary | ICD-10-CM

## 2020-07-15 ENCOUNTER — NURSE ONLY (OUTPATIENT)
Dept: LAB | Age: 40
End: 2020-07-15
Attending: PREVENTIVE MEDICINE

## 2020-07-15 DIAGNOSIS — Z78.9 PARTICIPANT IN HEALTH AND WELLNESS PLAN: ICD-10-CM

## 2020-07-15 PROCEDURE — 86769 SARS-COV-2 COVID-19 ANTIBODY: CPT

## 2020-07-16 LAB — SARS-COV-2 IGG SERPLBLD QL IA.RAPID: NEGATIVE

## 2020-08-14 ENCOUNTER — OFFICE VISIT (OUTPATIENT)
Dept: OTHER | Facility: HOSPITAL | Age: 40
End: 2020-08-14
Attending: EMERGENCY MEDICINE

## 2020-08-14 DIAGNOSIS — Z00.00 ROUTINE GENERAL MEDICAL EXAMINATION AT A HEALTH CARE FACILITY: Primary | ICD-10-CM

## 2020-08-14 PROCEDURE — 87389 HIV-1 AG W/HIV-1&-2 AB AG IA: CPT

## 2020-09-11 ENCOUNTER — LAB ENCOUNTER (OUTPATIENT)
Dept: LAB | Facility: HOSPITAL | Age: 40
End: 2020-09-11
Attending: INTERNAL MEDICINE
Payer: COMMERCIAL

## 2020-09-11 ENCOUNTER — OFFICE VISIT (OUTPATIENT)
Dept: INTERNAL MEDICINE CLINIC | Facility: CLINIC | Age: 40
End: 2020-09-11
Payer: COMMERCIAL

## 2020-09-11 VITALS
BODY MASS INDEX: 19.37 KG/M2 | WEIGHT: 135.31 LBS | HEIGHT: 70 IN | DIASTOLIC BLOOD PRESSURE: 74 MMHG | SYSTOLIC BLOOD PRESSURE: 106 MMHG | HEART RATE: 74 BPM | RESPIRATION RATE: 18 BRPM

## 2020-09-11 DIAGNOSIS — Z00.00 ANNUAL PHYSICAL EXAM: ICD-10-CM

## 2020-09-11 DIAGNOSIS — Z00.00 ANNUAL PHYSICAL EXAM: Primary | ICD-10-CM

## 2020-09-11 LAB
CHOLEST SMN-MCNC: 153 MG/DL (ref ?–200)
GLUCOSE BLD-MCNC: 79 MG/DL (ref 70–99)
HDLC SERPL-MCNC: 68 MG/DL (ref 40–59)
LDLC SERPL CALC-MCNC: 77 MG/DL (ref ?–100)
NONHDLC SERPL-MCNC: 85 MG/DL (ref ?–130)
PATIENT FASTING Y/N/NP: YES
PATIENT FASTING Y/N/NP: YES
TRIGL SERPL-MCNC: 41 MG/DL (ref 30–149)
VLDLC SERPL CALC-MCNC: 8 MG/DL (ref 0–30)

## 2020-09-11 PROCEDURE — 80061 LIPID PANEL: CPT

## 2020-09-11 PROCEDURE — 99395 PREV VISIT EST AGE 18-39: CPT | Performed by: INTERNAL MEDICINE

## 2020-09-11 PROCEDURE — 82947 ASSAY GLUCOSE BLOOD QUANT: CPT

## 2020-09-11 PROCEDURE — 3074F SYST BP LT 130 MM HG: CPT | Performed by: INTERNAL MEDICINE

## 2020-09-11 PROCEDURE — 3008F BODY MASS INDEX DOCD: CPT | Performed by: INTERNAL MEDICINE

## 2020-09-11 PROCEDURE — 82306 VITAMIN D 25 HYDROXY: CPT

## 2020-09-11 PROCEDURE — 36415 COLL VENOUS BLD VENIPUNCTURE: CPT

## 2020-09-11 PROCEDURE — 3078F DIAST BP <80 MM HG: CPT | Performed by: INTERNAL MEDICINE

## 2020-09-11 RX ORDER — ERGOCALCIFEROL 1.25 MG/1
50000 CAPSULE ORAL WEEKLY
COMMUNITY
Start: 2020-07-21 | End: 2021-06-02

## 2020-09-11 NOTE — PATIENT INSTRUCTIONS
Please obtain blood tests soon when you can. Remember your flu shot this fall. Please try to follow a healthy diet and to exercise regularly. If your vitamin D level is now normal, I would simply recommend you take a multiple vitamin daily.

## 2020-09-11 NOTE — H&P
Sergei Kelly is a 42year old male who presents for a complete physical exam.   HPI:   His last physical was February 2019. Lately he has been feeling well.   He has a screening form from his employer requiring documentation of height weight blood pressure l History    Tobacco Use      Smoking status: Never Smoker      Smokeless tobacco: Never Used    Alcohol use: No      Frequency: Never    Drug use: No          REVIEW OF SYSTEMS:   GENERAL: No fever  LUNGS: Occasional exertional dyspnea with strenuous physic Eleuterio Gonzalez MD  9/11/2020  2:03 PM

## 2020-09-14 LAB — 25(OH)D3 SERPL-MCNC: 41.2 NG/ML (ref 30–100)

## 2020-10-02 ENCOUNTER — OFFICE VISIT (OUTPATIENT)
Dept: OTHER | Facility: HOSPITAL | Age: 40
End: 2020-10-02
Attending: ORTHOPAEDIC SURGERY

## 2020-10-02 DIAGNOSIS — Z77.21 PERSONAL HISTORY OF EXPOSURE TO POTENTIALLY HAZARDOUS BODY FLUIDS: Primary | ICD-10-CM

## 2020-10-02 PROCEDURE — 86803 HEPATITIS C AB TEST: CPT

## 2020-11-06 ENCOUNTER — OFFICE VISIT (OUTPATIENT)
Dept: OTHER | Facility: HOSPITAL | Age: 40
End: 2020-11-06
Attending: EMERGENCY MEDICINE

## 2020-11-06 DIAGNOSIS — Z00.00 ROUTINE GENERAL MEDICAL EXAMINATION AT A HEALTH CARE FACILITY: Primary | ICD-10-CM

## 2020-11-06 PROCEDURE — 87389 HIV-1 AG W/HIV-1&-2 AB AG IA: CPT

## 2020-12-19 ENCOUNTER — IMMUNIZATION (OUTPATIENT)
Dept: LAB | Facility: HOSPITAL | Age: 40
End: 2020-12-19
Attending: PREVENTIVE MEDICINE
Payer: COMMERCIAL

## 2020-12-19 DIAGNOSIS — Z23 NEED FOR VACCINATION: ICD-10-CM

## 2020-12-19 PROCEDURE — 0001A PFIZER-BIONTECH COVID-19 VACCINE: CPT

## 2020-12-28 ENCOUNTER — OFFICE VISIT (OUTPATIENT)
Dept: OTHER | Facility: HOSPITAL | Age: 40
End: 2020-12-28
Attending: EMERGENCY MEDICINE

## 2020-12-28 DIAGNOSIS — Z01.84 IMMUNITY STATUS TESTING: Primary | ICD-10-CM

## 2020-12-28 PROCEDURE — 86803 HEPATITIS C AB TEST: CPT

## 2021-01-09 ENCOUNTER — IMMUNIZATION (OUTPATIENT)
Dept: LAB | Facility: HOSPITAL | Age: 41
End: 2021-01-09
Attending: PREVENTIVE MEDICINE

## 2021-01-09 DIAGNOSIS — Z23 NEED FOR VACCINATION: ICD-10-CM

## 2021-01-09 PROCEDURE — 0002A SARSCOV2 VAC 30MCG/0.3ML IM: CPT | Performed by: ADVANCED PRACTICE MIDWIFE

## 2021-01-25 ENCOUNTER — TELEPHONE (OUTPATIENT)
Dept: INTERNAL MEDICINE CLINIC | Facility: CLINIC | Age: 41
End: 2021-01-25

## 2021-01-25 RX ORDER — OFLOXACIN 3 MG/ML
5 SOLUTION AURICULAR (OTIC) 2 TIMES DAILY
Qty: 1 BOTTLE | Refills: 0 | Status: SHIPPED | OUTPATIENT
Start: 2021-01-25 | End: 2021-09-17

## 2021-03-04 ENCOUNTER — OFFICE VISIT (OUTPATIENT)
Dept: OTOLARYNGOLOGY | Facility: CLINIC | Age: 41
End: 2021-03-04
Payer: COMMERCIAL

## 2021-03-04 VITALS
BODY MASS INDEX: 21.9 KG/M2 | TEMPERATURE: 98 F | HEIGHT: 70 IN | DIASTOLIC BLOOD PRESSURE: 89 MMHG | WEIGHT: 153 LBS | SYSTOLIC BLOOD PRESSURE: 132 MMHG

## 2021-03-04 DIAGNOSIS — H69.83 DYSFUNCTION OF BOTH EUSTACHIAN TUBES: Primary | ICD-10-CM

## 2021-03-04 PROCEDURE — 99213 OFFICE O/P EST LOW 20 MIN: CPT | Performed by: OTOLARYNGOLOGY

## 2021-03-04 PROCEDURE — 3075F SYST BP GE 130 - 139MM HG: CPT | Performed by: OTOLARYNGOLOGY

## 2021-03-04 PROCEDURE — 3079F DIAST BP 80-89 MM HG: CPT | Performed by: OTOLARYNGOLOGY

## 2021-03-04 PROCEDURE — 3008F BODY MASS INDEX DOCD: CPT | Performed by: OTOLARYNGOLOGY

## 2021-03-05 NOTE — PROGRESS NOTES
Britany Pretty is a 47year old male. Patient presents with:  Ear Problem: bilateral ear pressure, right ear is worse for about 2 weeks     HPI:   About 2 weeks ago he started to experience a pressure sensation in his ears right greater than left.   He is not h time, place, person & situation. Appropriate mood and affect. Lymph Detail Normal Submental. Submandibular. Anterior cervical. Posterior cervical. Supraclavicular.    Eyes Normal Conjunctiva - Right: Normal, Left: Normal. Pupil - Right: Normal, Left: Norm

## 2021-05-04 ENCOUNTER — TELEPHONE (OUTPATIENT)
Dept: INTERNAL MEDICINE CLINIC | Facility: CLINIC | Age: 41
End: 2021-05-04

## 2021-05-04 RX ORDER — AZELASTINE 1 MG/ML
2 SPRAY, METERED NASAL 2 TIMES DAILY
Qty: 1 BOTTLE | Refills: 1 | Status: SHIPPED | OUTPATIENT
Start: 2021-05-04 | End: 2021-09-17

## 2021-06-01 ENCOUNTER — TELEPHONE (OUTPATIENT)
Dept: INTERNAL MEDICINE CLINIC | Facility: CLINIC | Age: 41
End: 2021-06-01

## 2021-06-01 RX ORDER — GABAPENTIN 100 MG/1
CAPSULE ORAL
Qty: 30 CAPSULE | Refills: 0 | Status: SHIPPED | OUTPATIENT
Start: 2021-06-01 | End: 2021-09-17

## 2021-06-01 RX ORDER — VALACYCLOVIR HYDROCHLORIDE 1 G/1
1 TABLET, FILM COATED ORAL EVERY 8 HOURS
Qty: 21 TABLET | Refills: 0 | Status: SHIPPED | OUTPATIENT
Start: 2021-06-01 | End: 2021-09-17

## 2021-06-03 ENCOUNTER — TELEPHONE (OUTPATIENT)
Dept: INTERNAL MEDICINE CLINIC | Facility: CLINIC | Age: 41
End: 2021-06-03

## 2021-09-17 ENCOUNTER — LAB ENCOUNTER (OUTPATIENT)
Dept: LAB | Facility: HOSPITAL | Age: 41
End: 2021-09-17
Attending: INTERNAL MEDICINE
Payer: COMMERCIAL

## 2021-09-17 ENCOUNTER — OFFICE VISIT (OUTPATIENT)
Dept: INTERNAL MEDICINE CLINIC | Facility: CLINIC | Age: 41
End: 2021-09-17
Payer: COMMERCIAL

## 2021-09-17 VITALS
HEART RATE: 85 BPM | BODY MASS INDEX: 22.43 KG/M2 | DIASTOLIC BLOOD PRESSURE: 76 MMHG | WEIGHT: 156.69 LBS | SYSTOLIC BLOOD PRESSURE: 126 MMHG | HEIGHT: 70 IN

## 2021-09-17 DIAGNOSIS — Z00.00 ANNUAL PHYSICAL EXAM: Primary | ICD-10-CM

## 2021-09-17 DIAGNOSIS — Z00.00 ANNUAL PHYSICAL EXAM: ICD-10-CM

## 2021-09-17 LAB
ALBUMIN SERPL-MCNC: 4.2 G/DL (ref 3.4–5)
ALBUMIN/GLOB SERPL: 1.2 {RATIO} (ref 1–2)
ALP LIVER SERPL-CCNC: 64 U/L
ALT SERPL-CCNC: 25 U/L
ANION GAP SERPL CALC-SCNC: 2 MMOL/L (ref 0–18)
AST SERPL-CCNC: 18 U/L (ref 15–37)
BILIRUB SERPL-MCNC: 0.9 MG/DL (ref 0.1–2)
BUN BLD-MCNC: 14 MG/DL (ref 7–18)
BUN/CREAT SERPL: 12 (ref 10–20)
CALCIUM BLD-MCNC: 9.1 MG/DL (ref 8.5–10.1)
CHLORIDE SERPL-SCNC: 105 MMOL/L (ref 98–112)
CHOLEST SERPL-MCNC: 177 MG/DL (ref ?–200)
CO2 SERPL-SCNC: 30 MMOL/L (ref 21–32)
CREAT BLD-MCNC: 1.17 MG/DL
DEPRECATED RDW RBC AUTO: 39.8 FL (ref 35.1–46.3)
ERYTHROCYTE [DISTWIDTH] IN BLOOD BY AUTOMATED COUNT: 11.9 % (ref 11–15)
GLOBULIN PLAS-MCNC: 3.6 G/DL (ref 2.8–4.4)
GLUCOSE BLD-MCNC: 93 MG/DL (ref 70–99)
HCT VFR BLD AUTO: 48.7 %
HDLC SERPL-MCNC: 49 MG/DL (ref 40–59)
HGB BLD-MCNC: 15.3 G/DL
LDLC SERPL CALC-MCNC: 119 MG/DL (ref ?–100)
MCH RBC QN AUTO: 28.5 PG (ref 26–34)
MCHC RBC AUTO-ENTMCNC: 31.4 G/DL (ref 31–37)
MCV RBC AUTO: 90.9 FL
NONHDLC SERPL-MCNC: 128 MG/DL (ref ?–130)
OSMOLALITY SERPL CALC.SUM OF ELEC: 284 MOSM/KG (ref 275–295)
PATIENT FASTING Y/N/NP: YES
PATIENT FASTING Y/N/NP: YES
PLATELET # BLD AUTO: 207 10(3)UL (ref 150–450)
POTASSIUM SERPL-SCNC: 4.2 MMOL/L (ref 3.5–5.1)
PROT SERPL-MCNC: 7.8 G/DL (ref 6.4–8.2)
RBC # BLD AUTO: 5.36 X10(6)UL
SODIUM SERPL-SCNC: 137 MMOL/L (ref 136–145)
TRIGL SERPL-MCNC: 47 MG/DL (ref 30–149)
VLDLC SERPL CALC-MCNC: 8 MG/DL (ref 0–30)
WBC # BLD AUTO: 4.7 X10(3) UL (ref 4–11)

## 2021-09-17 PROCEDURE — 36415 COLL VENOUS BLD VENIPUNCTURE: CPT

## 2021-09-17 PROCEDURE — 80061 LIPID PANEL: CPT

## 2021-09-17 PROCEDURE — 3074F SYST BP LT 130 MM HG: CPT | Performed by: INTERNAL MEDICINE

## 2021-09-17 PROCEDURE — 85027 COMPLETE CBC AUTOMATED: CPT

## 2021-09-17 PROCEDURE — 3008F BODY MASS INDEX DOCD: CPT | Performed by: INTERNAL MEDICINE

## 2021-09-17 PROCEDURE — 99395 PREV VISIT EST AGE 18-39: CPT | Performed by: INTERNAL MEDICINE

## 2021-09-17 PROCEDURE — 90715 TDAP VACCINE 7 YRS/> IM: CPT | Performed by: INTERNAL MEDICINE

## 2021-09-17 PROCEDURE — 90471 IMMUNIZATION ADMIN: CPT | Performed by: INTERNAL MEDICINE

## 2021-09-17 PROCEDURE — 80053 COMPREHEN METABOLIC PANEL: CPT

## 2021-09-17 PROCEDURE — 3078F DIAST BP <80 MM HG: CPT | Performed by: INTERNAL MEDICINE

## 2021-09-17 NOTE — PATIENT INSTRUCTIONS
Your blood pressure today was excellent at 126/76 and your exam was normal.  You received a Tdap vaccine today. Please get a flu shot soon as scheduled. Await results of today's blood tests. Continue healthy diet and regular exercise.   Call if you are c

## 2021-09-17 NOTE — H&P
Blanca Flores is a 47year old male who presents for a complete physical exam.   HPI:   His last visit here was for a physical September 2020. He feels well.   He did have an episode of herpes zoster affecting his right forehead this past June, treated with V Grandfather    • Other (Atrial Fibrillation) Maternal Grandfather    • Diabetes Paternal Aunt    • Hypertension Paternal Grandfather       Social History:  Social History    Tobacco Use      Smoking status: Never Smoker      Smokeless tobacco: Never Used Future  - LIPID PANEL;  Future      eKndall Langston MD  9/17/2021  9:26 AM

## 2021-10-09 ENCOUNTER — IMMUNIZATION (OUTPATIENT)
Dept: LAB | Facility: HOSPITAL | Age: 41
End: 2021-10-09
Attending: EMERGENCY MEDICINE
Payer: COMMERCIAL

## 2021-10-09 DIAGNOSIS — Z23 NEED FOR VACCINATION: Primary | ICD-10-CM

## 2021-10-09 PROCEDURE — 0004A SARSCOV2 VAC 30MCG/0.3ML IM: CPT

## 2021-10-09 PROCEDURE — 0003A SARSCOV2 VAC 30MCG/0.3ML IM: CPT

## 2021-12-17 ENCOUNTER — OFFICE VISIT (OUTPATIENT)
Dept: INTERNAL MEDICINE CLINIC | Facility: CLINIC | Age: 41
End: 2021-12-17
Payer: COMMERCIAL

## 2021-12-17 VITALS
WEIGHT: 153.88 LBS | HEART RATE: 84 BPM | HEIGHT: 70 IN | SYSTOLIC BLOOD PRESSURE: 119 MMHG | DIASTOLIC BLOOD PRESSURE: 81 MMHG | BODY MASS INDEX: 22.03 KG/M2

## 2021-12-17 DIAGNOSIS — M54.50 ACUTE RIGHT-SIDED LOW BACK PAIN WITHOUT SCIATICA: Primary | ICD-10-CM

## 2021-12-17 PROCEDURE — 99213 OFFICE O/P EST LOW 20 MIN: CPT | Performed by: INTERNAL MEDICINE

## 2021-12-17 PROCEDURE — 3008F BODY MASS INDEX DOCD: CPT | Performed by: INTERNAL MEDICINE

## 2021-12-17 PROCEDURE — 3074F SYST BP LT 130 MM HG: CPT | Performed by: INTERNAL MEDICINE

## 2021-12-17 PROCEDURE — 3079F DIAST BP 80-89 MM HG: CPT | Performed by: INTERNAL MEDICINE

## 2021-12-17 RX ORDER — NAPROXEN 500 MG/1
500 TABLET ORAL 2 TIMES DAILY WITH MEALS
Qty: 30 TABLET | Refills: 0 | Status: SHIPPED | OUTPATIENT
Start: 2021-12-17 | End: 2022-12-12

## 2021-12-17 NOTE — PROGRESS NOTES
Aron Peraza is a 46year old male. Patient presents with:  Low Back Pain: pt stts he has right side low back pain since sunday. HPI:   Since Saturday, 6 days ago, he has had persistent intermittent pain in his lower back.  Initially pain was in his lower bilaterally  NEURO: Reflexes 2+ bilateral patellar and 2+ bilateral Achilles. Strength 5/5 bilateral lower extremities without weakness       ASSESSMENT AND PLAN:   1.  Acute right-sided low back pain without sciatica  Likely musculoskeletal. No evidence of

## 2021-12-17 NOTE — PATIENT INSTRUCTIONS
Please avoid painful activity as best you can, perform gentle stretching exercises, and apply heat 2-3 times daily. Take naproxen 500 mg twice daily with meals. Call if not soon better.

## 2022-03-25 ENCOUNTER — OFFICE VISIT (OUTPATIENT)
Dept: INTERNAL MEDICINE CLINIC | Facility: CLINIC | Age: 42
End: 2022-03-25
Payer: COMMERCIAL

## 2022-03-25 VITALS
HEART RATE: 89 BPM | HEIGHT: 70 IN | BODY MASS INDEX: 22.62 KG/M2 | SYSTOLIC BLOOD PRESSURE: 130 MMHG | DIASTOLIC BLOOD PRESSURE: 86 MMHG | WEIGHT: 158 LBS

## 2022-03-25 DIAGNOSIS — R20.0 LEFT ARM NUMBNESS: Primary | ICD-10-CM

## 2022-03-25 PROCEDURE — 99213 OFFICE O/P EST LOW 20 MIN: CPT | Performed by: INTERNAL MEDICINE

## 2022-03-25 PROCEDURE — 3075F SYST BP GE 130 - 139MM HG: CPT | Performed by: INTERNAL MEDICINE

## 2022-03-25 PROCEDURE — 3079F DIAST BP 80-89 MM HG: CPT | Performed by: INTERNAL MEDICINE

## 2022-03-25 PROCEDURE — 3008F BODY MASS INDEX DOCD: CPT | Performed by: INTERNAL MEDICINE

## 2022-05-31 NOTE — PATIENT INSTRUCTIONS
Please monitor symptoms closely and call if they persist or worsen Quality 130: Documentation Of Current Medications In The Medical Record: Current Medications Documented Quality 431: Preventive Care And Screening: Unhealthy Alcohol Use - Screening: Patient screened for unhealthy alcohol use using a single question and scores less than 2 times per year Detail Level: Generalized Quality 226: Preventive Care And Screening: Tobacco Use: Screening And Cessation Intervention: Patient screened for tobacco use and is an ex/non-smoker

## 2022-08-09 ENCOUNTER — OFFICE VISIT (OUTPATIENT)
Dept: INTERNAL MEDICINE CLINIC | Facility: CLINIC | Age: 42
End: 2022-08-09
Payer: COMMERCIAL

## 2022-08-09 VITALS
HEART RATE: 85 BPM | DIASTOLIC BLOOD PRESSURE: 88 MMHG | HEIGHT: 70 IN | SYSTOLIC BLOOD PRESSURE: 127 MMHG | BODY MASS INDEX: 22.76 KG/M2 | WEIGHT: 159 LBS

## 2022-08-09 DIAGNOSIS — J02.9 SORE THROAT: Primary | ICD-10-CM

## 2022-08-09 LAB
CONTROL LINE PRESENT WITH A CLEAR BACKGROUND (YES/NO): PRESENT YES/NO
KIT LOT #: NORMAL NUMERIC
STREP GRP A CUL-SCR: NEGATIVE

## 2022-08-09 PROCEDURE — 3079F DIAST BP 80-89 MM HG: CPT | Performed by: NURSE PRACTITIONER

## 2022-08-09 PROCEDURE — 3074F SYST BP LT 130 MM HG: CPT | Performed by: NURSE PRACTITIONER

## 2022-08-09 PROCEDURE — 87880 STREP A ASSAY W/OPTIC: CPT | Performed by: NURSE PRACTITIONER

## 2022-08-09 PROCEDURE — 3008F BODY MASS INDEX DOCD: CPT | Performed by: NURSE PRACTITIONER

## 2022-08-09 PROCEDURE — 99213 OFFICE O/P EST LOW 20 MIN: CPT | Performed by: NURSE PRACTITIONER

## 2022-08-09 RX ORDER — PENICILLIN V POTASSIUM 500 MG/1
500 TABLET ORAL 2 TIMES DAILY
Qty: 20 TABLET | Refills: 0 | Status: SHIPPED | OUTPATIENT
Start: 2022-08-09 | End: 2022-08-19

## 2022-08-10 ENCOUNTER — TELEPHONE (OUTPATIENT)
Dept: INTERNAL MEDICINE CLINIC | Facility: HOSPITAL | Age: 42
End: 2022-08-10

## 2022-08-10 DIAGNOSIS — Z20.822 SUSPECTED COVID-19 VIRUS INFECTION: Primary | ICD-10-CM

## 2022-08-11 ENCOUNTER — LAB ENCOUNTER (OUTPATIENT)
Dept: LAB | Facility: HOSPITAL | Age: 42
End: 2022-08-11
Attending: PREVENTIVE MEDICINE

## 2022-08-11 DIAGNOSIS — Z20.822 SUSPECTED COVID-19 VIRUS INFECTION: ICD-10-CM

## 2022-08-11 LAB — SARS-COV-2 RNA RESP QL NAA+PROBE: NOT DETECTED

## 2022-08-11 NOTE — TELEPHONE ENCOUNTER
Results and RTW guidelines:    COVID RESULT:    [x] Viewed by employee in [facility]. RTW plan and instructions as indicated on triage call. Manager notified. Estimated RTW date:   [] Discussed with employee   [x] Unable to reach by phone. Sent via [employer] message      Test type:    [x] Rapid         [] Alinity         [] Outside test:       [x] NEGATIVE     Ordered Alinity retest?  []Yes   [x] No (skip to RTW)   Ordered Rapid retest?   []Yes   [x] No (skip to RTW)            Notes:     RTW PLAN:    []  If COVID positive results, off work minimum of 5 days from positive test or onset of symptoms (day 0)        On day 5, if asymptomatic or mildly symptomatic (with improving symptoms) may return to work day 6          On day 5, if symptomatic, call Employee Health for RTW screening        []  COVID positive result - call Employee Health on day 5 after symptom onset. The employee needs to be cleared by Employee Health to RTW. [x] RTW immediately, continue to monitor for sx  [] RTW when sx improve; must be fever free for 24 hours w/o medications, Diarrhea/Vomiting free for 24 hours w/o medications  [] Alinity ordered; continue to monitor sx and call for new/worsening sx.   Discuss RTW guidelines with manager  [] May continue to work  [] Follow up with PCP  [] Home until further instruction from hotline with Alinity results  INSTRUCTIONS PROVIDED:  [x]  Plan as noted above  []  Length of time to obtain results   []  Quarantine instructions  []  Masking protocol   []  S/S of worsening infection/condition and importance of prompt medical re-evaluation including when to seek emergency care  [] If symptoms develop, stay home and call hotline for rapid test order    Estimated RTW date:      [] The employee voiced understanding of above plan/instructions  [x] Manager Notified

## 2022-09-02 ENCOUNTER — OFFICE VISIT (OUTPATIENT)
Dept: INTERNAL MEDICINE CLINIC | Facility: CLINIC | Age: 42
End: 2022-09-02
Payer: COMMERCIAL

## 2022-09-02 VITALS
HEART RATE: 105 BPM | WEIGHT: 155.38 LBS | DIASTOLIC BLOOD PRESSURE: 75 MMHG | BODY MASS INDEX: 22.24 KG/M2 | SYSTOLIC BLOOD PRESSURE: 106 MMHG | HEIGHT: 70 IN

## 2022-09-02 DIAGNOSIS — G89.29 CHRONIC MIDLINE LOW BACK PAIN WITHOUT SCIATICA: Primary | ICD-10-CM

## 2022-09-02 DIAGNOSIS — M54.50 CHRONIC MIDLINE LOW BACK PAIN WITHOUT SCIATICA: Primary | ICD-10-CM

## 2022-09-02 PROCEDURE — 99213 OFFICE O/P EST LOW 20 MIN: CPT | Performed by: INTERNAL MEDICINE

## 2022-09-02 PROCEDURE — 3074F SYST BP LT 130 MM HG: CPT | Performed by: INTERNAL MEDICINE

## 2022-09-02 PROCEDURE — 3008F BODY MASS INDEX DOCD: CPT | Performed by: INTERNAL MEDICINE

## 2022-09-02 PROCEDURE — 3078F DIAST BP <80 MM HG: CPT | Performed by: INTERNAL MEDICINE

## 2022-09-02 RX ORDER — NAPROXEN 500 MG/1
500 TABLET ORAL 2 TIMES DAILY WITH MEALS
Qty: 30 TABLET | Refills: 0 | Status: SHIPPED | OUTPATIENT
Start: 2022-09-02 | End: 2023-08-28

## 2022-09-02 RX ORDER — CYCLOBENZAPRINE HCL 10 MG
10 TABLET ORAL 3 TIMES DAILY PRN
Qty: 30 TABLET | Refills: 0 | Status: SHIPPED | OUTPATIENT
Start: 2022-09-02 | End: 2022-09-22

## 2022-09-02 NOTE — PATIENT INSTRUCTIONS
Please take naproxen 500 mg twice daily with meals. Take cyclobenzaprine 10 mg 3 times daily as needed, watching for drowsiness. Please schedule physical therapy.

## 2022-09-23 ENCOUNTER — OFFICE VISIT (OUTPATIENT)
Dept: INTERNAL MEDICINE CLINIC | Facility: CLINIC | Age: 42
End: 2022-09-23

## 2022-09-23 ENCOUNTER — TELEPHONE (OUTPATIENT)
Dept: PHYSICAL THERAPY | Age: 42
End: 2022-09-23

## 2022-09-23 VITALS
SYSTOLIC BLOOD PRESSURE: 120 MMHG | BODY MASS INDEX: 22.05 KG/M2 | WEIGHT: 154 LBS | RESPIRATION RATE: 14 BRPM | DIASTOLIC BLOOD PRESSURE: 80 MMHG | HEART RATE: 88 BPM | OXYGEN SATURATION: 96 % | HEIGHT: 70 IN

## 2022-09-23 DIAGNOSIS — Z00.00 ANNUAL PHYSICAL EXAM: Primary | ICD-10-CM

## 2022-09-23 PROCEDURE — 3008F BODY MASS INDEX DOCD: CPT | Performed by: INTERNAL MEDICINE

## 2022-09-23 PROCEDURE — 3079F DIAST BP 80-89 MM HG: CPT | Performed by: INTERNAL MEDICINE

## 2022-09-23 PROCEDURE — 3074F SYST BP LT 130 MM HG: CPT | Performed by: INTERNAL MEDICINE

## 2022-09-23 PROCEDURE — 99395 PREV VISIT EST AGE 18-39: CPT | Performed by: INTERNAL MEDICINE

## 2022-09-23 NOTE — TELEPHONE ENCOUNTER
Called pt from wait list to offer a sooner eval appt on Wed 9/28 with Lelo Johnson. If pt is interested, I advised the pt to call the scheduling line to see if these openings are still available.

## 2022-09-23 NOTE — PATIENT INSTRUCTIONS
Your physical today was normal.  Please come in soon for blood tests when you can. Please get a COVID booster and flu shot soon. Continue healthy diet and regular exercise.

## 2022-09-28 ENCOUNTER — LAB ENCOUNTER (OUTPATIENT)
Dept: LAB | Age: 42
End: 2022-09-28
Attending: INTERNAL MEDICINE
Payer: COMMERCIAL

## 2022-09-28 DIAGNOSIS — Z00.00 ANNUAL PHYSICAL EXAM: ICD-10-CM

## 2022-09-28 LAB
ALBUMIN SERPL-MCNC: 4 G/DL (ref 3.4–5)
ALBUMIN/GLOB SERPL: 1.2 {RATIO} (ref 1–2)
ALP LIVER SERPL-CCNC: 64 U/L
ALT SERPL-CCNC: 23 U/L
ANION GAP SERPL CALC-SCNC: 4 MMOL/L (ref 0–18)
AST SERPL-CCNC: 16 U/L (ref 15–37)
BILIRUB SERPL-MCNC: 0.6 MG/DL (ref 0.1–2)
BUN BLD-MCNC: 11 MG/DL (ref 7–18)
BUN/CREAT SERPL: 9.9 (ref 10–20)
CALCIUM BLD-MCNC: 9.7 MG/DL (ref 8.5–10.1)
CHLORIDE SERPL-SCNC: 107 MMOL/L (ref 98–112)
CHOLEST SERPL-MCNC: 175 MG/DL (ref ?–200)
CO2 SERPL-SCNC: 30 MMOL/L (ref 21–32)
CREAT BLD-MCNC: 1.11 MG/DL
DEPRECATED RDW RBC AUTO: 42.5 FL (ref 35.1–46.3)
ERYTHROCYTE [DISTWIDTH] IN BLOOD BY AUTOMATED COUNT: 12.4 % (ref 11–15)
FASTING PATIENT LIPID ANSWER: YES
FASTING STATUS PATIENT QL REPORTED: YES
GFR SERPLBLD BASED ON 1.73 SQ M-ARVRAT: 87 ML/MIN/1.73M2 (ref 60–?)
GLOBULIN PLAS-MCNC: 3.3 G/DL (ref 2.8–4.4)
GLUCOSE BLD-MCNC: 94 MG/DL (ref 70–99)
HCT VFR BLD AUTO: 47.2 %
HDLC SERPL-MCNC: 46 MG/DL (ref 40–59)
HGB BLD-MCNC: 14.5 G/DL
LDLC SERPL CALC-MCNC: 112 MG/DL (ref ?–100)
MCH RBC QN AUTO: 28.4 PG (ref 26–34)
MCHC RBC AUTO-ENTMCNC: 30.7 G/DL (ref 31–37)
MCV RBC AUTO: 92.4 FL
NONHDLC SERPL-MCNC: 129 MG/DL (ref ?–130)
OSMOLALITY SERPL CALC.SUM OF ELEC: 291 MOSM/KG (ref 275–295)
PLATELET # BLD AUTO: 207 10(3)UL (ref 150–450)
POTASSIUM SERPL-SCNC: 4.2 MMOL/L (ref 3.5–5.1)
PROT SERPL-MCNC: 7.3 G/DL (ref 6.4–8.2)
RBC # BLD AUTO: 5.11 X10(6)UL
SODIUM SERPL-SCNC: 141 MMOL/L (ref 136–145)
TRIGL SERPL-MCNC: 94 MG/DL (ref 30–149)
VLDLC SERPL CALC-MCNC: 16 MG/DL (ref 0–30)
WBC # BLD AUTO: 3.9 X10(3) UL (ref 4–11)

## 2022-09-28 PROCEDURE — 80053 COMPREHEN METABOLIC PANEL: CPT

## 2022-09-28 PROCEDURE — 85027 COMPLETE CBC AUTOMATED: CPT

## 2022-09-28 PROCEDURE — 36415 COLL VENOUS BLD VENIPUNCTURE: CPT

## 2022-09-28 PROCEDURE — 80061 LIPID PANEL: CPT

## 2022-09-29 ENCOUNTER — TELEPHONE (OUTPATIENT)
Dept: PHYSICAL THERAPY | Facility: HOSPITAL | Age: 42
End: 2022-09-29

## 2022-10-03 ENCOUNTER — OFFICE VISIT (OUTPATIENT)
Dept: PHYSICAL THERAPY | Age: 42
End: 2022-10-03
Attending: INTERNAL MEDICINE
Payer: COMMERCIAL

## 2022-10-03 DIAGNOSIS — M54.50 CHRONIC MIDLINE LOW BACK PAIN WITHOUT SCIATICA: ICD-10-CM

## 2022-10-03 DIAGNOSIS — G89.29 CHRONIC MIDLINE LOW BACK PAIN WITHOUT SCIATICA: ICD-10-CM

## 2022-10-03 PROCEDURE — 97161 PT EVAL LOW COMPLEX 20 MIN: CPT

## 2022-10-05 ENCOUNTER — OFFICE VISIT (OUTPATIENT)
Dept: PHYSICAL THERAPY | Age: 42
End: 2022-10-05
Attending: INTERNAL MEDICINE
Payer: COMMERCIAL

## 2022-10-05 DIAGNOSIS — M54.50 CHRONIC MIDLINE LOW BACK PAIN WITHOUT SCIATICA: Primary | ICD-10-CM

## 2022-10-05 DIAGNOSIS — G89.29 CHRONIC MIDLINE LOW BACK PAIN WITHOUT SCIATICA: Primary | ICD-10-CM

## 2022-10-05 PROCEDURE — 97140 MANUAL THERAPY 1/> REGIONS: CPT

## 2022-10-05 PROCEDURE — 97110 THERAPEUTIC EXERCISES: CPT

## 2022-10-10 ENCOUNTER — OFFICE VISIT (OUTPATIENT)
Dept: PHYSICAL THERAPY | Age: 42
End: 2022-10-10
Attending: INTERNAL MEDICINE
Payer: COMMERCIAL

## 2022-10-10 DIAGNOSIS — G89.29 CHRONIC MIDLINE LOW BACK PAIN WITHOUT SCIATICA: Primary | ICD-10-CM

## 2022-10-10 DIAGNOSIS — M54.50 CHRONIC MIDLINE LOW BACK PAIN WITHOUT SCIATICA: Primary | ICD-10-CM

## 2022-10-10 PROCEDURE — 97140 MANUAL THERAPY 1/> REGIONS: CPT

## 2022-10-10 PROCEDURE — 97530 THERAPEUTIC ACTIVITIES: CPT

## 2022-10-12 ENCOUNTER — OFFICE VISIT (OUTPATIENT)
Dept: PHYSICAL THERAPY | Age: 42
End: 2022-10-12
Attending: INTERNAL MEDICINE
Payer: COMMERCIAL

## 2022-10-12 DIAGNOSIS — G89.29 CHRONIC MIDLINE LOW BACK PAIN WITHOUT SCIATICA: Primary | ICD-10-CM

## 2022-10-12 DIAGNOSIS — M54.50 CHRONIC MIDLINE LOW BACK PAIN WITHOUT SCIATICA: Primary | ICD-10-CM

## 2022-10-12 PROCEDURE — 97110 THERAPEUTIC EXERCISES: CPT

## 2022-10-17 ENCOUNTER — OFFICE VISIT (OUTPATIENT)
Dept: PHYSICAL THERAPY | Age: 42
End: 2022-10-17
Attending: INTERNAL MEDICINE
Payer: COMMERCIAL

## 2022-10-17 DIAGNOSIS — M54.50 CHRONIC MIDLINE LOW BACK PAIN WITHOUT SCIATICA: Primary | ICD-10-CM

## 2022-10-17 DIAGNOSIS — G89.29 CHRONIC MIDLINE LOW BACK PAIN WITHOUT SCIATICA: Primary | ICD-10-CM

## 2022-10-17 PROCEDURE — 97110 THERAPEUTIC EXERCISES: CPT

## 2022-10-19 ENCOUNTER — OFFICE VISIT (OUTPATIENT)
Dept: PHYSICAL THERAPY | Age: 42
End: 2022-10-19
Attending: INTERNAL MEDICINE
Payer: COMMERCIAL

## 2022-10-19 PROCEDURE — 97110 THERAPEUTIC EXERCISES: CPT

## 2022-10-20 ENCOUNTER — IMMUNIZATION (OUTPATIENT)
Dept: LAB | Facility: HOSPITAL | Age: 42
End: 2022-10-20
Attending: PREVENTIVE MEDICINE
Payer: COMMERCIAL

## 2022-10-20 DIAGNOSIS — Z23 NEED FOR VACCINATION: Primary | ICD-10-CM

## 2022-10-20 PROCEDURE — 90471 IMMUNIZATION ADMIN: CPT

## 2022-10-24 ENCOUNTER — OFFICE VISIT (OUTPATIENT)
Dept: PHYSICAL THERAPY | Age: 42
End: 2022-10-24
Attending: INTERNAL MEDICINE
Payer: COMMERCIAL

## 2022-10-24 DIAGNOSIS — G89.29 CHRONIC MIDLINE LOW BACK PAIN WITHOUT SCIATICA: Primary | ICD-10-CM

## 2022-10-24 DIAGNOSIS — M54.50 CHRONIC MIDLINE LOW BACK PAIN WITHOUT SCIATICA: Primary | ICD-10-CM

## 2022-10-24 PROCEDURE — 97110 THERAPEUTIC EXERCISES: CPT

## 2022-10-26 ENCOUNTER — OFFICE VISIT (OUTPATIENT)
Dept: PHYSICAL THERAPY | Age: 42
End: 2022-10-26
Attending: INTERNAL MEDICINE
Payer: COMMERCIAL

## 2022-10-26 DIAGNOSIS — M54.50 CHRONIC MIDLINE LOW BACK PAIN WITHOUT SCIATICA: Primary | ICD-10-CM

## 2022-10-26 DIAGNOSIS — G89.29 CHRONIC MIDLINE LOW BACK PAIN WITHOUT SCIATICA: Primary | ICD-10-CM

## 2022-10-26 PROCEDURE — 97110 THERAPEUTIC EXERCISES: CPT

## 2022-11-02 ENCOUNTER — OFFICE VISIT (OUTPATIENT)
Dept: PHYSICAL THERAPY | Age: 42
End: 2022-11-02
Attending: INTERNAL MEDICINE
Payer: COMMERCIAL

## 2022-11-02 DIAGNOSIS — G89.29 CHRONIC MIDLINE LOW BACK PAIN WITHOUT SCIATICA: Primary | ICD-10-CM

## 2022-11-02 DIAGNOSIS — M54.50 CHRONIC MIDLINE LOW BACK PAIN WITHOUT SCIATICA: Primary | ICD-10-CM

## 2022-11-02 PROCEDURE — 97110 THERAPEUTIC EXERCISES: CPT

## 2022-11-11 ENCOUNTER — IMMUNIZATION (OUTPATIENT)
Dept: LAB | Facility: HOSPITAL | Age: 42
End: 2022-11-11
Attending: PREVENTIVE MEDICINE
Payer: COMMERCIAL

## 2022-11-11 DIAGNOSIS — Z23 NEED FOR VACCINATION: Primary | ICD-10-CM

## 2022-11-11 PROCEDURE — 0124A SARSCOV2 VAC BVL 30MCG/0.3ML: CPT

## 2022-11-30 ENCOUNTER — OFFICE VISIT (OUTPATIENT)
Dept: PHYSICAL THERAPY | Age: 42
End: 2022-11-30
Attending: INTERNAL MEDICINE
Payer: COMMERCIAL

## 2022-11-30 DIAGNOSIS — M54.50 CHRONIC MIDLINE LOW BACK PAIN WITHOUT SCIATICA: Primary | ICD-10-CM

## 2022-11-30 DIAGNOSIS — G89.29 CHRONIC MIDLINE LOW BACK PAIN WITHOUT SCIATICA: Primary | ICD-10-CM

## 2022-11-30 PROCEDURE — 97110 THERAPEUTIC EXERCISES: CPT

## 2023-04-14 ENCOUNTER — HOSPITAL ENCOUNTER (OUTPATIENT)
Dept: GENERAL RADIOLOGY | Age: 43
Discharge: HOME | End: 2023-04-14
Attending: INTERNAL MEDICINE
Payer: COMMERCIAL

## 2023-04-14 DIAGNOSIS — M79.671 PAIN IN RIGHT FOOT: ICD-10-CM

## 2023-04-14 DIAGNOSIS — S99.921A INJURY OF RIGHT FOOT, INITIAL ENCOUNTER: ICD-10-CM

## 2023-04-14 PROCEDURE — 73620 X-RAY EXAM OF FOOT: CPT | Performed by: INTERNAL MEDICINE

## 2023-07-10 ENCOUNTER — TELEPHONE (OUTPATIENT)
Dept: INTERNAL MEDICINE CLINIC | Facility: CLINIC | Age: 43
End: 2023-07-10

## 2023-07-11 NOTE — TELEPHONE ENCOUNTER
Patient called office. Patient's date of birth and full name both confirmed. Reports symptoms are manageable for now. Denies severe balance issues - no falls   But every since having been on a pontoon boat on 7/4/23 weekend, felt like he is still on the boat at times. Ears have some pressure, but popping them does not work  Feels different than his history of BPPV. Denies room tilting sensation. Offered sooner appointment, but scheduled appointment works with patient's work schedule. Advised to monitor symptoms. RN advised if symptoms get severely worse, patient should seek care at Emergency Room or Immediate Care. RN also informed patient to seek immediate medical attention at ER if patient experiences severe/worsening symptoms, shortness of breath, chest pain, or severe pain. Patient verbalizes understanding and is agreeable to instructions.        Future Appointments   Date Time Provider Mariusz Clark   7/14/2023  1:30 PM Lei Cardona MD [facility]

## 2023-07-14 ENCOUNTER — OFFICE VISIT (OUTPATIENT)
Dept: INTERNAL MEDICINE CLINIC | Facility: CLINIC | Age: 43
End: 2023-07-14

## 2023-07-14 VITALS
RESPIRATION RATE: 14 BRPM | HEART RATE: 86 BPM | OXYGEN SATURATION: 99 % | HEIGHT: 70 IN | SYSTOLIC BLOOD PRESSURE: 110 MMHG | WEIGHT: 153 LBS | DIASTOLIC BLOOD PRESSURE: 78 MMHG | BODY MASS INDEX: 21.9 KG/M2

## 2023-07-14 DIAGNOSIS — R10.9 LEFT SIDED ABDOMINAL PAIN: ICD-10-CM

## 2023-07-14 DIAGNOSIS — R26.81 UNSTEADINESS: Primary | ICD-10-CM

## 2023-07-14 PROCEDURE — 3078F DIAST BP <80 MM HG: CPT | Performed by: INTERNAL MEDICINE

## 2023-07-14 PROCEDURE — 3008F BODY MASS INDEX DOCD: CPT | Performed by: INTERNAL MEDICINE

## 2023-07-14 PROCEDURE — 3074F SYST BP LT 130 MM HG: CPT | Performed by: INTERNAL MEDICINE

## 2023-07-14 PROCEDURE — 99214 OFFICE O/P EST MOD 30 MIN: CPT | Performed by: INTERNAL MEDICINE

## 2023-07-15 NOTE — PATIENT INSTRUCTIONS
Await results of blood and urine testing. If your abdominal discomfort persists I would next recommend a CT scan. Please schedule a brain MRI. Call if not soon better.

## 2023-07-18 ENCOUNTER — LAB ENCOUNTER (OUTPATIENT)
Dept: LAB | Age: 43
End: 2023-07-18
Attending: INTERNAL MEDICINE
Payer: COMMERCIAL

## 2023-07-18 DIAGNOSIS — R10.9 LEFT SIDED ABDOMINAL PAIN: ICD-10-CM

## 2023-07-18 LAB
ALBUMIN SERPL-MCNC: 4 G/DL (ref 3.4–5)
ALBUMIN/GLOB SERPL: 1.2 {RATIO} (ref 1–2)
ALP LIVER SERPL-CCNC: 61 U/L
ALT SERPL-CCNC: 32 U/L
ANION GAP SERPL CALC-SCNC: 8 MMOL/L (ref 0–18)
AST SERPL-CCNC: 20 U/L (ref 15–37)
BASOPHILS # BLD AUTO: 0.02 X10(3) UL (ref 0–0.2)
BASOPHILS NFR BLD AUTO: 0.4 %
BILIRUB SERPL-MCNC: 0.6 MG/DL (ref 0.1–2)
BILIRUB UR QL: NEGATIVE
BUN BLD-MCNC: 10 MG/DL (ref 7–18)
BUN/CREAT SERPL: 8.7 (ref 10–20)
CALCIUM BLD-MCNC: 9.1 MG/DL (ref 8.5–10.1)
CHLORIDE SERPL-SCNC: 107 MMOL/L (ref 98–112)
CLARITY UR: CLEAR
CO2 SERPL-SCNC: 27 MMOL/L (ref 21–32)
CREAT BLD-MCNC: 1.15 MG/DL
DEPRECATED RDW RBC AUTO: 41.1 FL (ref 35.1–46.3)
EOSINOPHIL # BLD AUTO: 0.06 X10(3) UL (ref 0–0.7)
EOSINOPHIL NFR BLD AUTO: 1.3 %
ERYTHROCYTE [DISTWIDTH] IN BLOOD BY AUTOMATED COUNT: 12.3 % (ref 11–15)
FASTING STATUS PATIENT QL REPORTED: YES
GFR SERPLBLD BASED ON 1.73 SQ M-ARVRAT: 83 ML/MIN/1.73M2 (ref 60–?)
GLOBULIN PLAS-MCNC: 3.3 G/DL (ref 2.8–4.4)
GLUCOSE BLD-MCNC: 81 MG/DL (ref 70–99)
GLUCOSE UR-MCNC: NORMAL MG/DL
HCT VFR BLD AUTO: 46.1 %
HGB BLD-MCNC: 14.7 G/DL
HGB UR QL STRIP.AUTO: NEGATIVE
IMM GRANULOCYTES # BLD AUTO: 0.01 X10(3) UL (ref 0–1)
IMM GRANULOCYTES NFR BLD: 0.2 %
KETONES UR-MCNC: NEGATIVE MG/DL
LEUKOCYTE ESTERASE UR QL STRIP.AUTO: NEGATIVE
LIPASE SERPL-CCNC: 55 U/L (ref 13–75)
LYMPHOCYTES # BLD AUTO: 1.26 X10(3) UL (ref 1–4)
LYMPHOCYTES NFR BLD AUTO: 28 %
MCH RBC QN AUTO: 28.8 PG (ref 26–34)
MCHC RBC AUTO-ENTMCNC: 31.9 G/DL (ref 31–37)
MCV RBC AUTO: 90.4 FL
MONOCYTES # BLD AUTO: 0.48 X10(3) UL (ref 0.1–1)
MONOCYTES NFR BLD AUTO: 10.7 %
NEUTROPHILS # BLD AUTO: 2.67 X10 (3) UL (ref 1.5–7.7)
NEUTROPHILS # BLD AUTO: 2.67 X10(3) UL (ref 1.5–7.7)
NEUTROPHILS NFR BLD AUTO: 59.4 %
NITRITE UR QL STRIP.AUTO: NEGATIVE
OSMOLALITY SERPL CALC.SUM OF ELEC: 292 MOSM/KG (ref 275–295)
PH UR: 6.5 [PH] (ref 5–8)
PLATELET # BLD AUTO: 202 10(3)UL (ref 150–450)
POTASSIUM SERPL-SCNC: 3.7 MMOL/L (ref 3.5–5.1)
PROT SERPL-MCNC: 7.3 G/DL (ref 6.4–8.2)
PROT UR-MCNC: NEGATIVE MG/DL
RBC # BLD AUTO: 5.1 X10(6)UL
SODIUM SERPL-SCNC: 142 MMOL/L (ref 136–145)
SP GR UR STRIP: 1.02 (ref 1–1.03)
UROBILINOGEN UR STRIP-ACNC: NORMAL
WBC # BLD AUTO: 4.5 X10(3) UL (ref 4–11)

## 2023-07-18 PROCEDURE — 36415 COLL VENOUS BLD VENIPUNCTURE: CPT

## 2023-07-18 PROCEDURE — 83690 ASSAY OF LIPASE: CPT

## 2023-07-18 PROCEDURE — 80053 COMPREHEN METABOLIC PANEL: CPT

## 2023-07-18 PROCEDURE — 85025 COMPLETE CBC W/AUTO DIFF WBC: CPT

## 2023-08-11 ENCOUNTER — HOSPITAL ENCOUNTER (OUTPATIENT)
Dept: MRI IMAGING | Age: 43
Discharge: HOME | End: 2023-08-11
Attending: INTERNAL MEDICINE
Payer: COMMERCIAL

## 2023-08-11 DIAGNOSIS — R26.81 UNSTEADINESS: ICD-10-CM

## 2023-08-11 PROCEDURE — 70551 MRI BRAIN STEM W/O DYE: CPT | Performed by: INTERNAL MEDICINE

## 2023-09-29 ENCOUNTER — OFFICE VISIT (OUTPATIENT)
Dept: DERMATOLOGY CLINIC | Facility: CLINIC | Age: 43
End: 2023-09-29

## 2023-09-29 ENCOUNTER — LAB ENCOUNTER (OUTPATIENT)
Dept: LAB | Age: 43
End: 2023-09-29
Attending: INTERNAL MEDICINE
Payer: COMMERCIAL

## 2023-09-29 ENCOUNTER — OFFICE VISIT (OUTPATIENT)
Dept: INTERNAL MEDICINE CLINIC | Facility: CLINIC | Age: 43
End: 2023-09-29

## 2023-09-29 VITALS
HEART RATE: 90 BPM | WEIGHT: 154.81 LBS | HEIGHT: 70 IN | DIASTOLIC BLOOD PRESSURE: 74 MMHG | BODY MASS INDEX: 22.16 KG/M2 | SYSTOLIC BLOOD PRESSURE: 118 MMHG

## 2023-09-29 DIAGNOSIS — Z00.00 ANNUAL PHYSICAL EXAM: Primary | ICD-10-CM

## 2023-09-29 DIAGNOSIS — Z80.8 FAMILY HX OF MELANOMA: ICD-10-CM

## 2023-09-29 DIAGNOSIS — D22.9 MULTIPLE NEVI: ICD-10-CM

## 2023-09-29 DIAGNOSIS — Z00.00 ANNUAL PHYSICAL EXAM: ICD-10-CM

## 2023-09-29 DIAGNOSIS — Z12.83 SCREENING EXAM FOR SKIN CANCER: Primary | ICD-10-CM

## 2023-09-29 LAB
ALBUMIN SERPL-MCNC: 4.1 G/DL (ref 3.4–5)
ALBUMIN/GLOB SERPL: 1.1 {RATIO} (ref 1–2)
ALP LIVER SERPL-CCNC: 61 U/L
ALT SERPL-CCNC: 36 U/L
ANION GAP SERPL CALC-SCNC: 8 MMOL/L (ref 0–18)
AST SERPL-CCNC: 24 U/L (ref 15–37)
BILIRUB SERPL-MCNC: 0.7 MG/DL (ref 0.1–2)
BUN BLD-MCNC: 15 MG/DL (ref 7–18)
BUN/CREAT SERPL: 12.5 (ref 10–20)
CALCIUM BLD-MCNC: 9.2 MG/DL (ref 8.5–10.1)
CHLORIDE SERPL-SCNC: 104 MMOL/L (ref 98–112)
CHOLEST SERPL-MCNC: 196 MG/DL (ref ?–200)
CO2 SERPL-SCNC: 27 MMOL/L (ref 21–32)
CREAT BLD-MCNC: 1.2 MG/DL
DEPRECATED RDW RBC AUTO: 40.7 FL (ref 35.1–46.3)
EGFRCR SERPLBLD CKD-EPI 2021: 78 ML/MIN/1.73M2 (ref 60–?)
ERYTHROCYTE [DISTWIDTH] IN BLOOD BY AUTOMATED COUNT: 12.3 % (ref 11–15)
FASTING PATIENT LIPID ANSWER: YES
FASTING STATUS PATIENT QL REPORTED: YES
GLOBULIN PLAS-MCNC: 3.7 G/DL (ref 2.8–4.4)
GLUCOSE BLD-MCNC: 95 MG/DL (ref 70–99)
HCT VFR BLD AUTO: 49.3 %
HDLC SERPL-MCNC: 52 MG/DL (ref 40–59)
HGB BLD-MCNC: 15.3 G/DL
LDLC SERPL CALC-MCNC: 128 MG/DL (ref ?–100)
MCH RBC QN AUTO: 27.9 PG (ref 26–34)
MCHC RBC AUTO-ENTMCNC: 31 G/DL (ref 31–37)
MCV RBC AUTO: 90 FL
NONHDLC SERPL-MCNC: 144 MG/DL (ref ?–130)
OSMOLALITY SERPL CALC.SUM OF ELEC: 289 MOSM/KG (ref 275–295)
PLATELET # BLD AUTO: 206 10(3)UL (ref 150–450)
POTASSIUM SERPL-SCNC: 4.1 MMOL/L (ref 3.5–5.1)
PROT SERPL-MCNC: 7.8 G/DL (ref 6.4–8.2)
RBC # BLD AUTO: 5.48 X10(6)UL
SODIUM SERPL-SCNC: 139 MMOL/L (ref 136–145)
TRIGL SERPL-MCNC: 89 MG/DL (ref 30–149)
TSI SER-ACNC: 2.29 MIU/ML (ref 0.36–3.74)
VLDLC SERPL CALC-MCNC: 16 MG/DL (ref 0–30)
WBC # BLD AUTO: 5.2 X10(3) UL (ref 4–11)

## 2023-09-29 PROCEDURE — 3008F BODY MASS INDEX DOCD: CPT | Performed by: INTERNAL MEDICINE

## 2023-09-29 PROCEDURE — 80061 LIPID PANEL: CPT

## 2023-09-29 PROCEDURE — 84443 ASSAY THYROID STIM HORMONE: CPT

## 2023-09-29 PROCEDURE — 85027 COMPLETE CBC AUTOMATED: CPT

## 2023-09-29 PROCEDURE — 80053 COMPREHEN METABOLIC PANEL: CPT

## 2023-09-29 PROCEDURE — 3078F DIAST BP <80 MM HG: CPT | Performed by: INTERNAL MEDICINE

## 2023-09-29 PROCEDURE — 36415 COLL VENOUS BLD VENIPUNCTURE: CPT

## 2023-09-29 PROCEDURE — 3074F SYST BP LT 130 MM HG: CPT | Performed by: INTERNAL MEDICINE

## 2023-09-29 PROCEDURE — 99203 OFFICE O/P NEW LOW 30 MIN: CPT | Performed by: PHYSICIAN ASSISTANT

## 2023-09-29 PROCEDURE — 99396 PREV VISIT EST AGE 40-64: CPT | Performed by: INTERNAL MEDICINE

## 2023-09-29 NOTE — PATIENT INSTRUCTIONS
Your physical today was normal.  Await results of today's blood tests. Remember to get a flu shot and the newest COVID booster soon. Continue healthy diet and regular exercise.

## 2023-09-29 NOTE — PROGRESS NOTES
HPI:    Patient ID: Kehinde Rdz is a 38year old male. Patient presents for full body skin exam. Patient with flat, spot of concern on the crown of his scalp for the past 1 year. Denies itching. Denies tenderness noted. Denies personal hx of skin cancer. Has fmaily hx of melanoma in his sister. No draining or tenderness noted. Review of Systems   Constitutional:  Negative for chills and fever. Musculoskeletal:  Negative for arthralgias and myalgias. Skin:  Positive for rash. Negative for color change and wound. Current Outpatient Medications   Medication Sig Dispense Refill    Multiple Vitamin (MULTI-VITAMIN) Oral Tab Take 1 tablet by mouth daily. Allergies:  Pseudoephedrine         OTHER (SEE COMMENTS)    Comment:Tachycardia   There were no vitals taken for this visit. There is no height or weight on file to calculate BMI. PHYSICAL EXAM:   Physical Exam  Constitutional:       General: He is not in acute distress. Appearance: Normal appearance. Skin:     General: Skin is warm and dry. Findings: Rash present. Comments: Multiple nevi noted throughout the body. No draining or tenderness noted. No scaling noted. Even in color. Dermoscopy shows benign patterns. Neurological:      Mental Status: He is alert and oriented to person, place, and time. ASSESSMENT/PLAN:   1. Screening exam for skin cancer  -After discussion with patient, advised the following:  Reassurance regarding other benign skin lesions. Signs and symptoms of skin cancer, ABCDE's of melanoma discussed with patient. Sunscreen use, sun protection, self exams reviewed. Followup as noted RTC ---routine checkup 6 mos -one year or p.r.n. Devang Sanchez -Pt was agreeable to plan and will comply with discussion above. 2. Multiple nevi  -Benign lesions  -Watch for now  -continue sun protection  -Return if there are changes.   -To call or follow-up with worsening symptoms or concerns.   -Return yearly. -Pt was agreeable to plan and will comply with discussion above. 3. Family hx of melanoma  -Encouraged skin checks yearly with family hx. No orders of the defined types were placed in this encounter.       Meds This Visit:  Requested Prescriptions      No prescriptions requested or ordered in this encounter       Imaging & Referrals:  None         #[id number]

## 2023-10-24 ENCOUNTER — TELEPHONE (OUTPATIENT)
Dept: INTERNAL MEDICINE CLINIC | Facility: CLINIC | Age: 43
End: 2023-10-24

## 2023-10-24 ENCOUNTER — EKG ENCOUNTER (OUTPATIENT)
Dept: LAB | Age: 43
End: 2023-10-24
Attending: INTERNAL MEDICINE

## 2023-10-24 DIAGNOSIS — R00.2 HEART PALPITATIONS: ICD-10-CM

## 2023-10-24 DIAGNOSIS — R00.2 HEART PALPITATIONS: Primary | ICD-10-CM

## 2023-10-24 LAB
ATRIAL RATE: 83 BPM
P AXIS: 78 DEGREES
P-R INTERVAL: 144 MS
Q-T INTERVAL: 336 MS
QRS DURATION: 92 MS
QTC CALCULATION (BEZET): 394 MS
R AXIS: 109 DEGREES
T AXIS: 58 DEGREES
VENTRICULAR RATE: 83 BPM

## 2023-10-24 PROCEDURE — 93010 ELECTROCARDIOGRAM REPORT: CPT | Performed by: INTERNAL MEDICINE

## 2023-10-24 PROCEDURE — 93005 ELECTROCARDIOGRAM TRACING: CPT

## 2023-10-24 NOTE — TELEPHONE ENCOUNTER
Pt feels palpitations -lately in a lot of stress     EKG  ordered   HR  resting  100-130s per pt  was 150   Annabelle Hanson  -  will call  him today   To call 911  and  ER  if any  cp sob  schultz -dizziness

## 2023-11-24 ENCOUNTER — NURSE TRIAGE (OUTPATIENT)
Dept: INTERNAL MEDICINE CLINIC | Facility: CLINIC | Age: 43
End: 2023-11-24

## 2023-11-24 NOTE — TELEPHONE ENCOUNTER
Action Requested: Summary for Provider     []  Critical Lab, Recommendations Needed  [] Need Additional Advice  []   FYI    []   Need Orders  [] Need Medications Sent to Pharmacy  []  Other     SUMMARY: Patient scheduled an appointment with PCP for GERD and chest pressure. Reports of having this issue intermittently for 2 weeks now. Recently has been drinking ginger ale but went back to just water due to his symptom. He tried taking over-the-counter Nexium, and increased it to 40 mg twice a day which he think helps but will still get chest pressure. Complains of pressure in the chest and right in the back sometimes after eating but goes away on its own. He has seen a cardiologist in October; EKG and Echo were normal.  He currently denies chest pain/pressure, or any other symptoms but wants to be seen for evaluation. Recommendation per clinical reference for control of acid reflux reviewed and discussed such as avoiding fatty and spicy foods, avoiding caffeinated/carbonated beverages. Advised that if symptoms worsen, he should go to ED for prompt evaluation and treatment. Patient verbalized understanding and agreed with plan of care.      Future Appointments   Date Time Provider Mariusz Clark   11/27/2023  5:30 PM Porfirio Sorenson MD [facility]     Reason for call: Chest Pressure  Onset: 2 Weeks Ago    Reason for Disposition   Patient wants to be seen    Protocols used: Chest Pain-A-[place]

## 2023-11-24 NOTE — TELEPHONE ENCOUNTER
Pt scheduled via [employer]. Please see below. Appointment For: Vicky Smith ([id number])   Visit Type: [address] [place] [address] ([id number])      11/27/2023   5:30 PM  30 mins.   Dillon Singh             [facility]      Patient Comments:   GERD, and chest pressure

## 2023-11-27 ENCOUNTER — OFFICE VISIT (OUTPATIENT)
Dept: INTERNAL MEDICINE CLINIC | Facility: CLINIC | Age: 43
End: 2023-11-27

## 2023-11-27 VITALS
HEART RATE: 88 BPM | HEIGHT: 70 IN | WEIGHT: 158.19 LBS | DIASTOLIC BLOOD PRESSURE: 80 MMHG | BODY MASS INDEX: 22.65 KG/M2 | SYSTOLIC BLOOD PRESSURE: 126 MMHG

## 2023-11-27 DIAGNOSIS — K21.9 GASTROESOPHAGEAL REFLUX DISEASE, UNSPECIFIED WHETHER ESOPHAGITIS PRESENT: Primary | ICD-10-CM

## 2023-11-27 PROCEDURE — 3079F DIAST BP 80-89 MM HG: CPT | Performed by: INTERNAL MEDICINE

## 2023-11-27 PROCEDURE — 99213 OFFICE O/P EST LOW 20 MIN: CPT | Performed by: INTERNAL MEDICINE

## 2023-11-27 PROCEDURE — 3074F SYST BP LT 130 MM HG: CPT | Performed by: INTERNAL MEDICINE

## 2023-11-27 PROCEDURE — 3008F BODY MASS INDEX DOCD: CPT | Performed by: INTERNAL MEDICINE

## 2023-11-27 NOTE — PATIENT INSTRUCTIONS
Please decrease omeprazole to 40 mg daily for 1 week, then 20 mg daily for 1 week, then stop if your symptoms are under control.

## 2024-01-22 RX ORDER — BENZONATATE 200 MG/1
200 CAPSULE ORAL 3 TIMES DAILY PRN
Qty: 30 CAPSULE | Refills: 0 | Status: SHIPPED | OUTPATIENT
Start: 2024-01-22

## 2024-01-22 NOTE — TELEPHONE ENCOUNTER
Patient states had  influenza B -feeling much  better ,but still cough bothers him at night especially symptoms cannot sleep-overall is improving but would like cough medicine      Benzonatate 200 mg 3 times daily as needed sent to the pharmacy    Monitor ,Follow-up soon  if not better or if persistent symptoms

## 2024-01-25 ENCOUNTER — OFFICE VISIT (OUTPATIENT)
Dept: INTERNAL MEDICINE CLINIC | Facility: CLINIC | Age: 44
End: 2024-01-25

## 2024-01-25 VITALS
DIASTOLIC BLOOD PRESSURE: 77 MMHG | OXYGEN SATURATION: 99 % | TEMPERATURE: 98 F | HEIGHT: 70 IN | HEART RATE: 83 BPM | SYSTOLIC BLOOD PRESSURE: 125 MMHG | WEIGHT: 156 LBS | RESPIRATION RATE: 18 BRPM | BODY MASS INDEX: 22.33 KG/M2

## 2024-01-25 DIAGNOSIS — R09.82 POST-NASAL DRIP: ICD-10-CM

## 2024-01-25 DIAGNOSIS — J06.9 URI WITH COUGH AND CONGESTION: Primary | ICD-10-CM

## 2024-01-25 PROCEDURE — 3078F DIAST BP <80 MM HG: CPT | Performed by: NURSE PRACTITIONER

## 2024-01-25 PROCEDURE — 99213 OFFICE O/P EST LOW 20 MIN: CPT | Performed by: NURSE PRACTITIONER

## 2024-01-25 PROCEDURE — 3008F BODY MASS INDEX DOCD: CPT | Performed by: NURSE PRACTITIONER

## 2024-01-25 PROCEDURE — 3074F SYST BP LT 130 MM HG: CPT | Performed by: NURSE PRACTITIONER

## 2024-01-25 NOTE — PATIENT INSTRUCTIONS
Start Flonase nasal spray daily     Add zytrec daily     Continue tessalon pearls as needed     Continue humidification and or saline nasal spray

## 2024-01-25 NOTE — PROGRESS NOTES
Terrell Ronquillo is a 43 year old male.  Chief Complaint   Patient presents with    Cough     Patient presents with dry cough and blood from coughing, unable to pass phlegm/mucus. 6/10 pain when coughing.     HPI:   He presents with a cough, congestion and post-nasal spray. The cough is productive at times. He did have some red-tinged sputum this morning when brushing his teeth. He denies any fevers, chest pain or SOB.     He has been taking tessalon pearls for cough as needed with some relief. He is using a cool mist humidifier and hot tea.     He was diagnosed with influenza B about 1.5 weeks ago.     Current Outpatient Medications   Medication Sig Dispense Refill    benzonatate 200 MG Oral Cap Take 1 capsule (200 mg total) by mouth 3 (three) times daily as needed for cough. 30 capsule 0    Multiple Vitamin (MULTI-VITAMIN) Oral Tab Take 1 tablet by mouth daily. (Patient not taking: Reported on 1/25/2024)        Past Medical History:   Diagnosis Date    Vitamin D deficiency       Past Surgical History:   Procedure Laterality Date    EP STUDY  1/8/2019         OTHER  May 2011    I&D right buttock abscess    OTHER  July 2011    Excision epidermoid cyst right buttock      Social History:  Social History     Socioeconomic History    Marital status: Single   Tobacco Use    Smoking status: Never    Smokeless tobacco: Never   Vaping Use    Vaping Use: Never used   Substance and Sexual Activity    Alcohol use: No    Drug use: No    Sexual activity: Not Currently     Partners: Female   Other Topics Concern    Caffeine Concern No    History of tanning Yes    Outdoor occupation No    Reaction to local anesthetic No    Pt has a pacemaker No    Pt has a defibrillator No      Family History   Problem Relation Age of Onset    Other (Hypercholesterolemia) Father     Other (Other) Father         lymphoplastmacytic lymphoma    Melanoma Sister     Diabetes Maternal Grandmother     Hypertension Maternal Grandfather     Other (Atrial  Fibrillation) Maternal Grandfather     Heart Disease Paternal Grandmother         Stent age 80s    Diabetes Paternal Grandmother     Hypertension Paternal Grandfather     Diabetes Paternal Aunt       Allergies   Allergen Reactions    Pseudoephedrine OTHER (SEE COMMENTS)     Tachycardia         REVIEW OF SYSTEMS:     Review of Systems   Constitutional:  Negative for fever.   HENT:  Positive for congestion and postnasal drip. Negative for ear pain, sinus pressure and sore throat.    Respiratory:  Positive for cough. Negative for shortness of breath and wheezing.    Cardiovascular:  Negative for chest pain.   Gastrointestinal:  Negative for abdominal pain.   Genitourinary: Negative.    Musculoskeletal: Negative.    Skin: Negative.    Neurological:  Negative for headaches.   Psychiatric/Behavioral: Negative.        Wt Readings from Last 5 Encounters:   01/25/24 156 lb (70.8 kg)   11/27/23 158 lb 3.2 oz (71.8 kg)   09/29/23 154 lb 12.8 oz (70.2 kg)   07/14/23 153 lb (69.4 kg)   09/23/22 154 lb (69.9 kg)     Body mass index is 22.38 kg/m².      EXAM:   /77 (BP Location: Right arm, Patient Position: Sitting, Cuff Size: adult)   Pulse 83   Temp 98 °F (36.7 °C) (Temporal)   Resp 18   Ht 5' 10\" (1.778 m)   Wt 156 lb (70.8 kg)   SpO2 99%   BMI 22.38 kg/m²     Physical Exam  Vitals reviewed.   Constitutional:       Appearance: Normal appearance.   HENT:      Head: Normocephalic.      Right Ear: Tympanic membrane normal.      Left Ear: Tympanic membrane normal.      Nose: Congestion present.      Mouth/Throat:      Pharynx: No posterior oropharyngeal erythema.   Cardiovascular:      Rate and Rhythm: Normal rate and regular rhythm.      Pulses: Normal pulses.   Pulmonary:      Breath sounds: Normal breath sounds. No wheezing.   Musculoskeletal:         General: No swelling. Normal range of motion.      Cervical back: Normal range of motion and neck supple.   Skin:     General: Skin is warm and dry.   Neurological:       Mental Status: He is alert and oriented to person, place, and time.   Psychiatric:         Mood and Affect: Mood normal.         Behavior: Behavior normal.            ASSESSMENT AND PLAN:   1. URI with cough and congestion  - start Flonase nasal spray  - continue tessalon pearls PRN  - add zyrtec daily     2. Post-nasal drip  - start Flonase nasal spray daily       The patient indicates understanding of these issues and agrees to the plan.  Return for if symptoms do not resolve.

## 2024-01-31 ENCOUNTER — HOSPITAL ENCOUNTER (OUTPATIENT)
Dept: GENERAL RADIOLOGY | Age: 44
Discharge: HOME | End: 2024-01-31
Attending: INTERNAL MEDICINE
Payer: COMMERCIAL

## 2024-01-31 DIAGNOSIS — R05.2 SUBACUTE COUGH: ICD-10-CM

## 2024-01-31 DIAGNOSIS — R07.81 RIB PAIN ON RIGHT SIDE: ICD-10-CM

## 2024-01-31 PROCEDURE — 71046 X-RAY EXAM CHEST 2 VIEWS: CPT | Performed by: INTERNAL MEDICINE

## 2024-02-06 ENCOUNTER — TELEPHONE (OUTPATIENT)
Dept: INTERNAL MEDICINE CLINIC | Facility: CLINIC | Age: 44
End: 2024-02-06

## 2024-02-06 ENCOUNTER — LAB ENCOUNTER (OUTPATIENT)
Dept: LAB | Age: 44
End: 2024-02-06
Attending: INTERNAL MEDICINE
Payer: COMMERCIAL

## 2024-02-06 DIAGNOSIS — R06.09 DOE (DYSPNEA ON EXERTION): Primary | ICD-10-CM

## 2024-02-06 DIAGNOSIS — R06.09 DOE (DYSPNEA ON EXERTION): ICD-10-CM

## 2024-02-06 LAB — D DIMER PPP FEU-MCNC: <0.27 UG/ML FEU (ref ?–0.5)

## 2024-02-06 PROCEDURE — 36415 COLL VENOUS BLD VENIPUNCTURE: CPT

## 2024-02-06 PROCEDURE — 85379 FIBRIN DEGRADATION QUANT: CPT

## 2024-02-06 NOTE — TELEPHONE ENCOUNTER
Per pt  had some schultz running stairs this AM , cough is better  has  some pain in lower cage area  ribs  that is improving .

## 2024-02-28 LAB — AMB EXT COVID-19 RESULT: DETECTED

## 2024-02-29 ENCOUNTER — TELEPHONE (OUTPATIENT)
Dept: INTERNAL MEDICINE CLINIC | Facility: HOSPITAL | Age: 44
End: 2024-02-29

## 2024-02-29 ENCOUNTER — NURSE TRIAGE (OUTPATIENT)
Dept: INTERNAL MEDICINE CLINIC | Facility: CLINIC | Age: 44
End: 2024-02-29

## 2024-02-29 NOTE — TELEPHONE ENCOUNTER
Action Requested: Summary for Provider     []  Critical Lab, Recommendations Needed  [] Need Additional Advice  []   FYI    []   Need Orders  [] Need Medications Sent to Pharmacy  []  Other     SUMMARY: patient took a home covid test on 2/28 and tested positive. Is having Body aches, chills, Fever this am 100.7, headaches, mild cough, increased tiredness. Went over home care advice. Call back or go to Walk in care or Immediate care if new symptoms arise or if s/sx worsen or has questions or concerns. Patient verbalized understanding and agrees with plan.    Chart updated reflecting positive Covid in ext result console    Reason for call: Covid  Onset: 2/28/24      Taking over the counter: Tylenol 500 mg every 6-7 hours  101.7F yesterday   Denies sore throat, shortness of breath, loss of smell or taste    Reason for Disposition   [1] COVID-19 diagnosed by positive lab test (e.g., PCR, rapid self-test kit) AND [2] mild symptoms (e.g., cough, fever, others) AND [3] no complications or SOB    Protocols used: Coronavirus (COVID-19) Diagnosed or [id number]

## 2024-02-29 NOTE — TELEPHONE ENCOUNTER
Outside Covid Testing done    Results and RTW guidelines:    COVID RESULT reported:      Test type:    [] Rapid outside         [] PCR outside       [x] Home Test    Date of test: 02/28/2024     Test location: Home          [] Result viewed in Epic with verbal consent received from employee     [x] Results per Employee Covid Dashboard    [] Employee instructed to email copy of result to [email or web address]       [] Discussed with employee     [x] Unable to reach by phone.  Sent via [employer] message      [x] Positive    - Employee should quarantine at home for at least 5 days (day 1 is day after sx onset) , follow the CDC guidelines for cleaning and                              quarantining; see CDC.gov   -This employee may RTW on day 6 if asymptomatic or mildly symptomatic (with improving symptoms).  Call Employee Health on day 5 if unable to return on                      day 6 after symptom onset.    -This employee needs to call Employee Health on day 5 after symptom onset.  The employee needs to be cleared by Employee Health.  - If Employee is still experiencing severe symptoms must make a RTW appt with Employee Health, Employee will not be cleared if:    1. Has consistent cough, shortness of breath or fatigue that restricts your physical activities    2. Is still feeling \"unwell\"    3. Within 15 days of hospitalization for COVID    4. Within 20 days of intubation for COVID    5. Still has a fever, vomiting or diarrhea   - Keep communication open with management about RTW and if symptoms worsen    - Monitor sx and temperature    - Employee should quarantine at home for at least 5 days from sx onset, follow the CDC guidelines for cleaning and quarantining; see CDC.gov                  - Notify PCP of result                 - Seek emergent care with worsening symptoms        Notes:     RTW PLAN:    [x]  If COVID positive results, off work minimum of 5 days from positive test or onset of symptoms (day 0)         On day 5, if asymptomatic or mildly symptomatic (with improving symptoms) may return to work day 6          On day 5, if symptomatic, call Employee Health for RTW screening        []  COVID positive result - call Employee Health on day 5 after symptom onset.  The employee needs to be cleared by Employee Health to RTW.  [] RTW immediately, continue to monitor for sx  [] RTW when sx improve; must be fever free for 24 hours w/o medications, Diarrhea/Vomiting free for 24 hours w/o medications  [] [facility] ordered; continue to monitor sx and call for new/worsening sx.  Discuss RTW guidelines with manager             [] Rapid ordered to confirm home negative.   [] May continue to work  [x] Follow up with PCP  [] Home until further instruction from hotline with [facility] results  INSTRUCTIONS PROVIDED:  [x]  Plan as noted above  []  Length of time to obtain results  []  May return to work if views negative in My Chart and  remains fever, vomiting, and diarrhea free  []  May continue to work if remains asymptomatic   [x]  Quarantine instructions  [x]  Masking protocol  []  S/S of worsening infection/condition and importance of prompt medical re-evaluation including when to seek emergency care  [] If symptoms develop, stay home and call hotline for rapid test order    Estimated RTW date:  03/05/2024  [x] Manager notified        [] CHRISTINE  []MARY LOU   [x] [facility]  Manager : Ashleigh Tirado    [x] Direct Patient Care  []Indirect Patient Contact   [] Non-Clinical/No Patient Contact    High Risk Area:    [] Yes   [x] No    For Direct Patient Care ONLY: Have you been fitted with an N95 mask? [] Yes  []No      HAVE YOU RECEIVED THE COVID-19 Vaccine? Yes [x]    No []          If yes, date(s) received: 12/19/2020; 01/09/2021; 11/11/2022           Which vaccine:  Pfizer [x]     Moderna []    J&J []      SYMPTOMS (reported via dashboard):  [] asymptomatic  [x] symptomatic  [] GI symptoms only    Symptom onset date: 02/28/2024    Fever   > 100F              Yes [x]      Cough                          Yes [x]      Shortness of breath  Yes []      Congestion                 Yes []      Runny nose                Yes []        Loss of Smell              Yes []        Loss of Taste             Yes []       Sore throat                 Yes [x]       Fatigue                        Yes []       Body Aches                Yes [x]        Chills                           Yes [x]        Headache                   Yes [x]             GI symptoms             Yes []     No [x]                     Nausea   []          Vomiting            []                                    Diarrhea  []          Upset stomach []      Employee reported COVID Exposure?  Yes []     No [x]    Date of exposure:   []  Coworker                       [] patient                        [] Family/friend    PPE:   [] N95 Mask/PAPR  [] Standard Mask  [] Eyewear  [] None    Within 6 feet for >15 minutes? [] Yes []  No    Is this a true exposure? []  Yes []  No    When was the last shift you worked?: 02/28/2024    Employee has a history of Covid?  Yes []     No [x]   If Yes, when:    Notes:

## 2024-03-18 ENCOUNTER — OFFICE VISIT (OUTPATIENT)
Dept: INTERNAL MEDICINE CLINIC | Facility: CLINIC | Age: 44
End: 2024-03-18

## 2024-03-18 ENCOUNTER — LAB ENCOUNTER (OUTPATIENT)
Dept: LAB | Age: 44
End: 2024-03-18
Attending: INTERNAL MEDICINE
Payer: COMMERCIAL

## 2024-03-18 VITALS
HEART RATE: 108 BPM | DIASTOLIC BLOOD PRESSURE: 85 MMHG | BODY MASS INDEX: 22.1 KG/M2 | HEIGHT: 70 IN | SYSTOLIC BLOOD PRESSURE: 125 MMHG | WEIGHT: 154.38 LBS

## 2024-03-18 DIAGNOSIS — R00.2 PALPITATIONS: Primary | ICD-10-CM

## 2024-03-18 LAB
ATRIAL RATE: 86 BPM
P AXIS: 82 DEGREES
P-R INTERVAL: 142 MS
Q-T INTERVAL: 334 MS
QRS DURATION: 92 MS
QTC CALCULATION (BEZET): 399 MS
R AXIS: 119 DEGREES
T AXIS: 74 DEGREES
VENTRICULAR RATE: 86 BPM

## 2024-03-18 PROCEDURE — 99213 OFFICE O/P EST LOW 20 MIN: CPT | Performed by: INTERNAL MEDICINE

## 2024-03-18 PROCEDURE — 93010 ELECTROCARDIOGRAM REPORT: CPT | Performed by: INTERNAL MEDICINE

## 2024-03-18 PROCEDURE — 93005 ELECTROCARDIOGRAM TRACING: CPT

## 2024-03-18 RX ORDER — METOPROLOL SUCCINATE 25 MG/1
25 TABLET, EXTENDED RELEASE ORAL DAILY
Qty: 30 TABLET | Refills: 5 | Status: SHIPPED | OUTPATIENT
Start: 2024-03-18

## 2024-03-18 NOTE — PROGRESS NOTES
Terrell Ronquillo is a 43 year old male.   Chief Complaint   Patient presents with    Follow - Up     F/u on increase heart rate since .     HPI:   Yesterday, after eating and while brushing his teeth, he noted the onset of palpitations with a faster regular heart rhythm, as high as the 110s.  Palpitations persisted intermittently but seemed to improve later yesterday, but then recurred last night and awoke him from sleep.  He also has had some regular palpitations this morning.  He thought he might be somewhat dehydrated and he has been drinking more fluid yet symptoms persist.  Mild lightheadedness initially.  Some upper back pain.  No chest pain or shortness of breath.    He has had similar palpitations in the past.  Previous EP study unremarkable.  Echo and 14-day cardiac monitor last fall normal except sinus tachycardia.  He saw Cardiology and beta-blocker therapy was discussed but not initiated.    Although his sister  this past January, he denies any recent increased stress or anxiety.  Current Outpatient Medications   Medication Sig Dispense Refill    Multiple Vitamin (MULTI-VITAMIN) Oral Tab Take 1 tablet by mouth daily.       Allergies   Allergen Reactions    Pseudoephedrine OTHER (SEE COMMENTS)     Tachycardia       Past Medical History:   Diagnosis Date    Vitamin D deficiency      Past Surgical History:   Procedure Laterality Date    EP STUDY  2019         OTHER  May 2011    I&D right buttock abscess    OTHER  2011    Excision epidermoid cyst right buttock      Social History:  Social History     Socioeconomic History    Marital status: Single   Tobacco Use    Smoking status: Never    Smokeless tobacco: Never   Vaping Use    Vaping Use: Never used   Substance and Sexual Activity    Alcohol use: No    Drug use: No    Sexual activity: Not Currently     Partners: Female   Other Topics Concern    Caffeine Concern No    History of tanning Yes    Outdoor occupation No    Reaction to local  anesthetic No    Pt has a pacemaker No    Pt has a defibrillator No        EXAM:   GENERAL: Pleasant male appearing well in no distress  /85 (BP Location: Right arm, Patient Position: Sitting, Cuff Size: adult)   Pulse 108   Ht 5' 10\" (1.778 m)   Wt 154 lb 6.4 oz (70 kg)   BMI 22.15 kg/m²   LUNGS: Resonant to percussion and clear to auscultation  CARDIAC: Rhythm regular S1 S2 normal without murmur, JVD or edema  ABDOMEN: Bowel sounds normal soft nontender without mass or hepatosplenomegaly      ASSESSMENT AND PLAN:   1. Palpitations  Recurrent.  Previous workup remarkable only for sinus tachycardia.  EKG today.  Order sent.  Discussed initiation of beta-blocker therapy.  He will consider and let me know  - EKG 12 Lead    Spoke to him later today 3-18.  He is willing to try a beta-blocker.  Prescription for metoprolol ER 25 mg 1 tablet daily sent to pharmacy.      The patient indicates understanding of these issues and agrees to the plan.  The patient is asked to return as needed.    Efrain Cruz MD  3/18/2024  9:41 AM

## 2024-04-04 ENCOUNTER — OFFICE VISIT (OUTPATIENT)
Dept: INTERNAL MEDICINE CLINIC | Facility: CLINIC | Age: 44
End: 2024-04-04

## 2024-04-04 VITALS
BODY MASS INDEX: 21.76 KG/M2 | HEIGHT: 70 IN | OXYGEN SATURATION: 100 % | HEART RATE: 95 BPM | SYSTOLIC BLOOD PRESSURE: 119 MMHG | WEIGHT: 152 LBS | RESPIRATION RATE: 17 BRPM | DIASTOLIC BLOOD PRESSURE: 80 MMHG

## 2024-04-04 DIAGNOSIS — R42 LIGHTHEADEDNESS: Primary | ICD-10-CM

## 2024-04-04 DIAGNOSIS — R06.02 SHORTNESS OF BREATH: ICD-10-CM

## 2024-04-04 PROCEDURE — 99212 OFFICE O/P EST SF 10 MIN: CPT | Performed by: NURSE PRACTITIONER

## 2024-04-04 NOTE — ASSESSMENT & PLAN NOTE
Patient with regular heart rate with episodes of tachycardia.  Heart sounds normal rate and rhythm in the office.  No abnormal heart sounds.  Lungs clear.  Lightheadedness might be an adverse reaction from the metoprolol.  He went to the ER and his workup was unremarkable.    Plan  To take is metoprolol in the evening  Hydrate  Follow up with Pulmonology.

## 2024-04-04 NOTE — ASSESSMENT & PLAN NOTE
Patient experiencing fatigue, SOB and some nausea.    Plan   Continue to monitor HR and SAT  Follow up with pulmonology

## 2024-04-04 NOTE — PROGRESS NOTES
HPI:    Patient ID: Terrell Ronquillo is a 43 year old male.    HPI Lightheaded/Fatigue/SOB  43-year-old male who works at [employer] and has developed regular palpitations at work and had awaken him when he was sleeping.  He recently had COVID-19.  His sister recently passed away in January but he denies any depression, or stress.  He does admit to mild anxiety.    He had a cardiac workup which included echocardiogram, a 14-day cardiac monitor and it was noted by cardiology that he may want to start a beta-blocker.    He was started on a beta-blocker 25 mg on 3/18/2024.      Immunization History   Administered Date(s) Administered    >=3 YRS TRI  MULTIDOSE VIAL (01225) FLU CLINIC 11/02/2023    Covid-19 Vaccine Pfizer 30 mcg/0.3 ml 12/19/2020, 01/09/2021, 10/09/2021    Covid-19 Vaccine Pfizer Bivalent 30mcg/0.3mL 11/11/2022    FLULAVAL 6 months & older 0.5 ml Prefilled syringe (21277) 10/20/2022    Flulaval, 3 Years & >, IM 09/24/2019    Influenza 10/04/2016, 09/25/2018, 10/22/2018, 10/09/2020, 10/22/2021, 10/27/2023    TDAP 04/04/2011, 09/17/2021       Past Medical History:   Diagnosis Date    Vitamin D deficiency       Past Surgical History:   Procedure Laterality Date    Ep study  1/8/2019         Other  May 2011    I&D right buttock abscess    Other  July 2011    Excision epidermoid cyst right buttock      Social History     Socioeconomic History    Marital status: Single   Tobacco Use    Smoking status: Never    Smokeless tobacco: Never   Vaping Use    Vaping Use: Never used   Substance and Sexual Activity    Alcohol use: No    Drug use: No    Sexual activity: Not Currently     Partners: Female   Other Topics Concern    Caffeine Concern No    History of tanning Yes    Outdoor occupation No    Reaction to local anesthetic No    Pt has a pacemaker No    Pt has a defibrillator No          Review of Systems   Constitutional:  Positive for fatigue. Negative for chills and fever.   HENT:  Negative for ear pain, hearing  loss, sinus pain, sore throat and trouble swallowing.         Ear pressure   Eyes:  Negative for pain and visual disturbance.   Respiratory:  Positive for shortness of breath. Negative for cough and chest tightness.    Cardiovascular:  Negative for chest pain, palpitations and leg swelling.        Chest discomfort   Gastrointestinal:  Positive for nausea. Negative for abdominal pain, constipation, diarrhea, rectal pain and vomiting.   Endocrine: Negative for cold intolerance and heat intolerance.   Genitourinary:  Negative for dysuria and hematuria.   Musculoskeletal:  Negative for back pain and joint swelling.   Skin:  Negative for rash.   Allergic/Immunologic: Negative for environmental allergies.   Neurological:  Positive for light-headedness (Lightheadness). Negative for weakness, numbness and headaches.   Hematological:  Does not bruise/bleed easily.   Psychiatric/Behavioral:  Negative for dysphoric mood and sleep disturbance. The patient is nervous/anxious.               Current Outpatient Medications   Medication Sig Dispense Refill    metoprolol succinate ER 25 MG Oral Tablet 24 Hr Take 1 tablet (25 mg total) by mouth daily. 30 tablet 5    Multiple Vitamin (MULTI-VITAMIN) Oral Tab Take 1 tablet by mouth daily. (Patient not taking: Reported on 4/4/2024)       Allergies:  Allergies   Allergen Reactions    Pseudoephedrine OTHER (SEE COMMENTS)     Tachycardia       PHYSICAL EXAM:   Physical Exam  /80   Pulse 95   Resp 17   Ht 5' 10\" (1.778 m)   Wt 152 lb (68.9 kg)   SpO2 100%   BMI 21.81 kg/m²   Wt Readings from Last 2 Encounters:   04/04/24 152 lb (68.9 kg)   03/18/24 154 lb 6.4 oz (70 kg)     Body mass index is 21.81 kg/m².(2)  Lab Results   Component Value Date    WBC 5.2 09/29/2023    RBC 5.48 09/29/2023    HGB 15.3 09/29/2023    HCT 49.3 09/29/2023    MCV 90.0 09/29/2023    MCH 27.9 09/29/2023    MCHC 31.0 09/29/2023    RDW 12.3 09/29/2023    .0 09/29/2023    MPV 8.9 12/31/2018      Lab  Results   Component Value Date    GLU 95 09/29/2023    BUN 15 09/29/2023    BUNCREA 12.5 09/29/2023    CREATSERUM 1.20 09/29/2023    ANIONGAP 8 09/29/2023    GFRNAA 79 09/17/2021    GFRAA 91 09/17/2021    CA 9.2 09/29/2023    OSMOCALC 289 09/29/2023    ALKPHO 61 09/29/2023    AST 24 09/29/2023    ALT 36 09/29/2023    ALKPHOS 54 07/23/2015    BILT 0.7 09/29/2023    TP 7.8 09/29/2023    ALB 4.1 09/29/2023    GLOBULIN 3.7 09/29/2023    AGRATIO 1.3 07/23/2015     09/29/2023    K 4.1 09/29/2023     09/29/2023    CO2 27.0 09/29/2023      No results found for: \"EAG\", \"A1C\"   Lab Results   Component Value Date    CHOLEST 196 09/29/2023    TRIG 89 09/29/2023    HDL 52 09/29/2023     (H) 09/29/2023    VLDL 16 09/29/2023    NONHDLC 144 (H) 09/29/2023    CALCNONHDL 142 (H) 07/23/2015      Lab Results   Component Value Date    T4F 1.2 04/02/2020    TSH 2.290 09/29/2023                ASSESSMENT/PLAN:     Problem List Items Addressed This Visit       Lightheadedness - Primary     Patient with regular heart rate with episodes of tachycardia.  Heart sounds normal rate and rhythm in the office.  No abnormal heart sounds.  Lungs clear.  Lightheadedness might be an adverse reaction from the metoprolol.  He went to the ER and his workup was unremarkable.    Plan  To take is metoprolol in the evening  Hydrate  Follow up with Pulmonology.         Shortness of breath     Patient experiencing fatigue, SOB and some nausea.    Plan   Continue to monitor HR and SAT  Follow up with pulmonology               No orders of the defined types were placed in this encounter.      Meds This Visit:  Requested Prescriptions      No prescriptions requested or ordered in this encounter       Imaging & Referrals:  None         BHARAT Oquendo

## 2024-04-07 NOTE — PROGRESS NOTES
Follow up phone call.    A little less lightheaded feeling..  He is taking the metoprolol at night.    He is able to control episodes of tachycardia with deep breathing techniques.    Rowan Sanabria, DNP

## 2024-04-12 ENCOUNTER — OFFICE VISIT (OUTPATIENT)
Dept: INTERNAL MEDICINE CLINIC | Facility: CLINIC | Age: 44
End: 2024-04-12
Payer: COMMERCIAL

## 2024-04-12 VITALS
SYSTOLIC BLOOD PRESSURE: 123 MMHG | HEIGHT: 70 IN | OXYGEN SATURATION: 99 % | BODY MASS INDEX: 22.02 KG/M2 | DIASTOLIC BLOOD PRESSURE: 78 MMHG | RESPIRATION RATE: 17 BRPM | HEART RATE: 94 BPM | WEIGHT: 153.81 LBS

## 2024-04-12 DIAGNOSIS — R00.0 SINUS TACHYCARDIA: Primary | ICD-10-CM

## 2024-04-12 PROCEDURE — 99213 OFFICE O/P EST LOW 20 MIN: CPT | Performed by: INTERNAL MEDICINE

## 2024-04-12 NOTE — PROGRESS NOTES
Terrell Ronquillo is a 43 year old male.   Chief Complaint   Patient presents with    Medication Follow-Up     HPI:   Terrell presents this afternoon for follow-up of symptomatic sinus tachycardia.  At his visit last month, low-dose metoprolol was prescribed.    While taking metoprolol, he has had persistent though less frequent episodes of symptomatic sinus tachycardia and fast heart rates.  However, he has developed side effects of bilateral ear pressure, frequent lightheadedness, increased appetite and increased thirst.  While sitting in a car on March 30 he had an episode of palpitations with a fast heartbeat and shortness of breath.  He went to [facility].  BMP normal.  CBC normal.  Troponin T normal at less than 6.  EKG revealed sinus rhythm with rate 82.  His symptoms resolved while he was in the ER.    BP checks 110-120s/70-80s.  Pulse rate typically 70-80s as well.  He wonders whether a lower dose of metoprolol would help.  In discussion he believes that anxiety may be a contributing factor.  Current Outpatient Medications   Medication Sig Dispense Refill    metoprolol succinate ER 25 MG Oral Tablet 24 Hr Take 1 tablet (25 mg total) by mouth daily. 30 tablet 5    Multiple Vitamin (MULTI-VITAMIN) Oral Tab Take 1 tablet by mouth daily. (Patient not taking: Reported on 4/4/2024)       Allergies   Allergen Reactions    Pseudoephedrine OTHER (SEE COMMENTS)     Tachycardia       Past Medical History:    Vitamin D deficiency     Past Surgical History:   Procedure Laterality Date    Ep study  1/8/2019         Other  May 2011    I&D right buttock abscess    Other  July 2011    Excision epidermoid cyst right buttock      Social History:  Social History     Socioeconomic History    Marital status: Single   Tobacco Use    Smoking status: Never    Smokeless tobacco: Never   Vaping Use    Vaping status: Never Used   Substance and Sexual Activity    Alcohol use: No    Drug use: No    Sexual activity: Not Currently     Partners:  Female   Other Topics Concern    Caffeine Concern No    History of tanning Yes    Outdoor occupation No    Reaction to local anesthetic No    Pt has a pacemaker No    Pt has a defibrillator No        EXAM:   GENERAL: Pleasant male appearing well in no distress  /78   Pulse 94   Resp 17   Ht 5' 10\" (1.778 m)   Wt 153 lb 12.8 oz (69.8 kg)   SpO2 99%   BMI 22.07 kg/m² Upon recheck by me, /72 right arm supine and 128/78 right arm standing with increased lightheadedness upon standing  HEENT: Anicteric, conjunctiva pink, canals and TMs normal bilaterally, oropharynx normal  NECK: Supple without mass or lymphadenopathy  LUNGS: Resonant to percussion and clear to auscultation  CARDIAC: Rhythm regular S1 S2 normal without murmur or edema  ABDOMEN: Bowel sounds normal soft nontender       ASSESSMENT AND PLAN:   1. Sinus tachycardia  Symptomatic episodes somewhat improved on low-dose metoprolol, but now with side effects  Discussed that decreasing dose of metoprolol may help alleviate side effects but may not control frequency of symptomatic episodes of tachycardia as well  After discussion, decrease metoprolol to 25 mg 1/2 tablet daily  Discussed possible follow-up with Cardiology.  He will consider  Discussed counseling for anxiety.  He will consider      The patient indicates understanding of these issues and agrees to the plan.  The patient is asked to return as needed.    Efrain Cruz MD  4/12/2024  2:27 PM

## 2024-04-12 NOTE — PATIENT INSTRUCTIONS
Decrease metoprolol to 25 mg 1/2 tablet daily  Consider follow-up with Cardiology and also counseling for anxiety

## 2024-04-26 ENCOUNTER — OFFICE VISIT (OUTPATIENT)
Dept: AUDIOLOGY | Facility: CLINIC | Age: 44
End: 2024-04-26

## 2024-04-26 ENCOUNTER — OFFICE VISIT (OUTPATIENT)
Dept: OTOLARYNGOLOGY | Facility: CLINIC | Age: 44
End: 2024-04-26
Payer: COMMERCIAL

## 2024-04-26 DIAGNOSIS — H93.8X9 PRESSURE SENSATION IN EAR, UNSPECIFIED LATERALITY: Primary | ICD-10-CM

## 2024-04-26 DIAGNOSIS — R42 DIZZY: Primary | ICD-10-CM

## 2024-04-26 DIAGNOSIS — H90.72 MIXED CONDUCTIVE AND SENSORINEURAL HEARING LOSS OF LEFT EAR WITH UNRESTRICTED HEARING OF RIGHT EAR: ICD-10-CM

## 2024-04-26 DIAGNOSIS — R26.89 IMBALANCE: ICD-10-CM

## 2024-04-26 PROCEDURE — 92567 TYMPANOMETRY: CPT | Performed by: AUDIOLOGIST

## 2024-04-26 PROCEDURE — 99203 OFFICE O/P NEW LOW 30 MIN: CPT | Performed by: OTOLARYNGOLOGY

## 2024-04-26 PROCEDURE — 92557 COMPREHENSIVE HEARING TEST: CPT | Performed by: AUDIOLOGIST

## 2024-04-26 RX ORDER — CELECOXIB 200 MG/1
200 CAPSULE ORAL DAILY PRN
Qty: 30 CAPSULE | Refills: 0 | Status: SHIPPED | OUTPATIENT
Start: 2024-04-26 | End: 2024-05-26

## 2024-04-26 RX ORDER — CYCLOBENZAPRINE HCL 5 MG
5 TABLET ORAL NIGHTLY
Qty: 30 TABLET | Refills: 1 | Status: SHIPPED | OUTPATIENT
Start: 2024-04-26

## 2024-04-26 NOTE — PROGRESS NOTES
Terrell Ronquillo is a 43 year old male.    Chief Complaint   Patient presents with    Ear Problem     Bilateral ear pressure with balance issues since beginning metoprolol          HISTORY OF PRESENT ILLNESS  Presents with a history of bilateral ear pressure and imbalance with a sensation of fullness over his frontal sinus since starting metoprolol.  Seen by Dr. Cruz and had his pill cut in half due to thought that perhaps his symptoms may be related to the medication.  No improvement in symptoms.  Does grind his teeth has not used a bite guard for quite some time.  Bicarb that he has purchased over-the-counter.  Feels fullness in his ears feels pressure.  Audiogram performed today reveals slight worsening of his hearing bilaterally with a known asymmetry since 2019 but symmetric drop in hearing on both sides.  Normal tympanograms and speech discrimination scores.  No other signs, symptoms or complaints.  He does describe himself as having issues with anxiety.      Social History     Socioeconomic History    Marital status: Single   Tobacco Use    Smoking status: Never    Smokeless tobacco: Never   Vaping Use    Vaping status: Never Used   Substance and Sexual Activity    Alcohol use: No    Drug use: No    Sexual activity: Not Currently     Partners: Female   Other Topics Concern    Caffeine Concern No    History of tanning Yes    Outdoor occupation No    Reaction to local anesthetic No    Pt has a pacemaker No    Pt has a defibrillator No       Family History   Problem Relation Age of Onset    Other (Hypercholesterolemia) Father     Other (Other) Father         lymphoplastmacytic lymphoma    Melanoma Sister     Diabetes Maternal Grandmother     Hypertension Maternal Grandfather     Other (Atrial Fibrillation) Maternal Grandfather     Heart Disease Paternal Grandmother         Stent age 80s    Diabetes Paternal Grandmother     Hypertension Paternal Grandfather     Diabetes Paternal Aunt        Past Medical History:     Vitamin D deficiency       Past Surgical History:   Procedure Laterality Date    Ep study  1/8/2019         Other  May 2011    I&D right buttock abscess    Other  July 2011    Excision epidermoid cyst right buttock         REVIEW OF SYSTEMS    System Neg/Pos Details   Constitutional Negative Fatigue, fever and weight loss.   ENMT Negative Drooling.   Eyes Negative Blurred vision and vision changes.   Respiratory Negative Dyspnea and wheezing.   Cardio Negative Chest pain, irregular heartbeat/palpitations and syncope.   GI Negative Abdominal pain and diarrhea.   Endocrine Negative Cold intolerance and heat intolerance.   Neuro Negative Tremors.   Psych Negative Anxiety and depression.   Integumentary Negative Frequent skin infections, pigment change and rash.   Hema/Lymph Negative Easy bleeding and easy bruising.           PHYSICAL EXAM    There were no vitals taken for this visit.       Constitutional Normal Overall appearance - Normal.   Psychiatric Normal Orientation - Oriented to time, place, person & situation. Appropriate mood and affect.   Neck Exam Normal Inspection - Normal. Palpation - Normal. Parotid gland - Normal. Thyroid gland - Normal.   Eyes Normal Conjunctiva - Right: Normal, Left: Normal. Pupil - Right: Normal, Left: Normal. Fundus - Right: Normal, Left: Normal.   Neurological Normal Memory - Normal. Cranial nerves - Cranial nerves II through XII grossly intact.   Head/Face Normal Facial features - Normal. Eyebrows - Normal. Skull - Normal.        Nasopharynx Normal External nose - Normal. Lips/teeth/gums - Normal. Tonsils - Normal. Oropharynx - Normal.   Ears Normal Inspection - Right: Normal, Left: Normal. Canal - Right: Normal, Left: Normal. TM - Right: Normal, Left: Normal.   Skin Normal Inspection - Normal.        Lymph Detail Normal Submental. Submandibular. Anterior cervical. Posterior cervical. Supraclavicular.        Nose/Mouth/Throat Normal External nose - Normal. Lips/teeth/gums - Normal.  Tonsils - Normal. Oropharynx - Normal.   Nose/Mouth/Throat Normal Nares - Right: Normal Left: Normal. Septum -Normal  Turbinates - Right: Normal, Left: Normal.       Current Outpatient Medications:     celecoxib 200 MG Oral Cap, Take 1 capsule (200 mg total) by mouth daily as needed for Pain., Disp: 30 capsule, Rfl: 0    cyclobenzaprine 5 MG Oral Tab, Take 1 tablet (5 mg total) by mouth nightly., Disp: 30 tablet, Rfl: 1    metoprolol succinate ER 25 MG Oral Tablet 24 Hr, Take 1 tablet (25 mg total) by mouth daily., Disp: 30 tablet, Rfl: 5    Multiple Vitamin (MULTI-VITAMIN) Oral Tab, Take 1 tablet by mouth daily. (Patient not taking: Reported on 4/4/2024), Disp: , Rfl:   ASSESSMENT AND PLAN    1. Pressure sensation in ear, unspecified laterality  - Audiology Referral - [facility])    2. Imbalance  Etiology of his symptoms unclear but does not sound like this is a reaction to metoprolol.  He does grind his teeth but has not worn a bite guard for some time.  No real tenderness on palpation of the TMJs today but I did recommend Celebrex and cyclobenzaprine warm heat soft diet and chewing both sides of mouth.  Return to see me in 1 month for reevaluation if no improvement.        This note was prepared using Dragon Medical voice recognition dictation software. As a result errors may occur. When identified these errors have been corrected. While every attempt is made to correct errors during dictation discrepancies may still exist    Asad Hood MD    4/26/2024    2:35 PM

## 2024-07-12 ENCOUNTER — OFFICE VISIT (OUTPATIENT)
Dept: INTERNAL MEDICINE CLINIC | Facility: CLINIC | Age: 44
End: 2024-07-12
Payer: COMMERCIAL

## 2024-07-12 VITALS
HEIGHT: 70 IN | DIASTOLIC BLOOD PRESSURE: 83 MMHG | WEIGHT: 153.31 LBS | SYSTOLIC BLOOD PRESSURE: 124 MMHG | BODY MASS INDEX: 21.95 KG/M2 | HEART RATE: 76 BPM

## 2024-07-12 DIAGNOSIS — R06.83 LOUD SNORING: ICD-10-CM

## 2024-07-12 DIAGNOSIS — E53.8 VITAMIN B12 DEFICIENCY: ICD-10-CM

## 2024-07-12 DIAGNOSIS — R53.83 OTHER FATIGUE: Primary | ICD-10-CM

## 2024-07-12 DIAGNOSIS — Z00.00 PE (PHYSICAL EXAM), ROUTINE: ICD-10-CM

## 2024-07-12 DIAGNOSIS — E55.9 VITAMIN D DEFICIENCY: ICD-10-CM

## 2024-07-12 DIAGNOSIS — D50.8 OTHER IRON DEFICIENCY ANEMIA: ICD-10-CM

## 2024-07-12 DIAGNOSIS — R00.0 SINUS TACHYCARDIA: ICD-10-CM

## 2024-07-12 PROCEDURE — 99396 PREV VISIT EST AGE 40-64: CPT | Performed by: INTERNAL MEDICINE

## 2024-07-12 NOTE — PROGRESS NOTES
Subjective:   Patient ID: Terrell Ronquillo is a 43 year old male.  Chief Complaint   Patient presents with    Physical     Patient presents today for physical exam, states doing well otherwise, has occasional palpitations  seeing  DR Paulino   He  feels fatigued and  sometimes non refreshed after 7-8 hr of sleeping   was told he has loud snoring and sometimes feels   grasping  for air.  denies chest pain, shortness of breath, dyspnea on exertion  , also denies nausea, vomiting, diarrhea, constipation or abdominal pain.   No fever, chills, or UTI symptoms.   The rest of the review of systems, see below.      Tired  This is a chronic problem. The current episode started more than 1 year ago. Associated symptoms include fatigue and vertigo. Pertinent negatives include no abdominal pain, chest pain, chills, congestion, coughing, fever, headaches, nausea, sore throat or vomiting. The symptoms are aggravated by eating. He has tried relaxation and rest for the symptoms. The treatment provided mild relief.       History/Other:   Review of Systems   Constitutional:  Positive for fatigue. Negative for chills and fever.   HENT:  Positive for sinus pressure (frontal area-sometimes). Negative for congestion, ear pain, postnasal drip and sore throat.    Eyes:  Negative for pain and redness.   Respiratory:  Negative for cough, shortness of breath and wheezing.         Feels tired ,  has loud snoring -was told -   sometimes  wakes up grasping  for air    can easily  fall a  sleep  in the afternoon after meal ,   Cardiovascular:  Positive for palpitations (feels   palpitations  -   Hr   80-130s -- 160 -   last  for   couple  seconds  and goes away spontaneusly  - seeing  Cardiologist   for tht Dr Paulino). Negative for chest pain and leg swelling.   Gastrointestinal:  Negative for abdominal pain, anal bleeding, blood in stool, constipation, diarrhea, nausea and vomiting.   Genitourinary:  Negative for dysuria, frequency, penile discharge,  penile swelling, scrotal swelling and testicular pain.   Skin:  Negative for pallor.   Neurological:  Positive for vertigo. Negative for dizziness and headaches.        Vertigo occasionally feels  hear  pressure off  balance -    goes away   Feels occasionally  nasal  congestion    Psychiatric/Behavioral:  Negative for behavioral problems and confusion. The patient is not nervous/anxious.      Current Outpatient Medications   Medication Sig Dispense Refill    cyclobenzaprine 5 MG Oral Tab Take 1 tablet (5 mg total) by mouth nightly. 30 tablet 1    metoprolol succinate ER 25 MG Oral Tablet 24 Hr Take 1 tablet (25 mg total) by mouth daily. 30 tablet 5    Multiple Vitamin (MULTI-VITAMIN) Oral Tab Take 1 tablet by mouth daily. (Patient not taking: Reported on 7/12/2024)       Allergies:  Allergies   Allergen Reactions    Pseudoephedrine OTHER (SEE COMMENTS)     Tachycardia        Objective:   Physical Exam  Vitals and nursing note reviewed.   Constitutional:       General: He is not in acute distress.     Appearance: Normal appearance.   HENT:      Head: Normocephalic and atraumatic.      Right Ear: Tympanic membrane, ear canal and external ear normal. There is no impacted cerumen.      Left Ear: Tympanic membrane, ear canal and external ear normal. There is no impacted cerumen.      Nose: Nose normal. No congestion or rhinorrhea.      Mouth/Throat:      Mouth: Mucous membranes are moist.      Tongue: No lesions. Tongue does not deviate from midline.      Palate: No mass and lesions.      Pharynx: Uvula midline. No oropharyngeal exudate, posterior oropharyngeal erythema or uvula swelling.      Comments: Oral cavity packed  Eyes:      General: No scleral icterus.        Right eye: No discharge.         Left eye: No discharge.      Extraocular Movements: Extraocular movements intact.      Conjunctiva/sclera: Conjunctivae normal.      Pupils: Pupils are equal, round, and reactive to light.   Cardiovascular:      Rate and  Rhythm: Normal rate and regular rhythm.      Heart sounds: Normal heart sounds. No murmur heard.  Pulmonary:      Effort: Pulmonary effort is normal. No respiratory distress.      Breath sounds: Normal breath sounds. No wheezing or rales.   Abdominal:      General: Bowel sounds are normal.      Palpations: Abdomen is soft. There is no mass.      Tenderness: There is no abdominal tenderness. There is no right CVA tenderness or left CVA tenderness.      Comments: No hepatomegaly, no splenomegaly   Musculoskeletal:      Cervical back: Neck supple.      Right lower leg: No edema.      Left lower leg: No edema.   Lymphadenopathy:      Cervical: No cervical adenopathy.   Skin:     General: Skin is warm and dry.             Comments: Mole on  top of head -   Neurological:      Mental Status: He is alert and oriented to person, place, and time.   Psychiatric:         Mood and Affect: Mood normal.         Behavior: Behavior normal.     Blood pressure 124/83, pulse 76, height 5' 10\" (1.778 m), weight 153 lb 4.8 oz (69.5 kg).      Assessment & Plan:   No diagnosis found.  Physical exam   Maintain a healthy diet , low saturated fat and low sugar diet  Keep good hydration  Maintain a regular activity /walking as tolerated   Complete labs as ordered,   Preventative health maintenance tests reviewed   Immunizations reviewed disussed - recommend  flu shot  yearly in fall . Covid 19 last  booster   Patient verbalized understanding and compliance          Skin mole   Referred to Derm   pt state has his dermatologist  to schedule apt soon    - CBC With Differential With Platelet; Future  - Comp Metabolic Panel (14); Future  - TSH W Reflex To Free T4; Future  - Lipid Panel; Future  - Urinalysis with Culture Reflex; Future  - Folic Acid Serum (Folate); Future  - Vitamin D; Future  - Iron And Tibc; Future  - Ferritin; Future      Hx  atrial  tachycardia   Palpitations   Cpm  stable   Labs to complete   Seeing  Dr Jefferosn Ballesteros  cpm    conservative management     Other iron deficiency anemia  Labs   - Folic Acid Serum (Folate); Future  - Vitamin D; Future  - Iron And Tibc; Future  - Ferritin; Future  Diet educaiton     Vitamin D deficiency  Labs   - Vitamin D; Future    Vitamin B12 deficiency  Labs   - Vitamin B12 [E]; Future    Other fatigue  Loud snoring  Easily  falling  a sleep in pm   Grasping  for air   Recommend  sleep  test check for apnea   Pt education    - DIAGOSTIC SLEEP STUDY  - General sleep study; Future       No orders of the defined types were placed in this encounter.      Meds This Visit:  Requested Prescriptions      No prescriptions requested or ordered in this encounter       Imaging & Referrals:  None

## 2024-07-15 ENCOUNTER — LAB ENCOUNTER (OUTPATIENT)
Dept: LAB | Age: 44
End: 2024-07-15
Attending: INTERNAL MEDICINE
Payer: COMMERCIAL

## 2024-07-15 DIAGNOSIS — D50.8 OTHER IRON DEFICIENCY ANEMIA: ICD-10-CM

## 2024-07-15 DIAGNOSIS — E53.8 VITAMIN B12 DEFICIENCY: ICD-10-CM

## 2024-07-15 DIAGNOSIS — E55.9 VITAMIN D DEFICIENCY: ICD-10-CM

## 2024-07-15 DIAGNOSIS — Z00.00 PE (PHYSICAL EXAM), ROUTINE: ICD-10-CM

## 2024-07-15 LAB
ALBUMIN SERPL-MCNC: 4.6 G/DL (ref 3.2–4.8)
ALBUMIN/GLOB SERPL: 1.4 {RATIO} (ref 1–2)
ALP LIVER SERPL-CCNC: 74 U/L
ALT SERPL-CCNC: 44 U/L
ANION GAP SERPL CALC-SCNC: 7 MMOL/L (ref 0–18)
AST SERPL-CCNC: 31 U/L (ref ?–34)
BASOPHILS # BLD AUTO: 0.02 X10(3) UL (ref 0–0.2)
BASOPHILS NFR BLD AUTO: 0.4 %
BILIRUB SERPL-MCNC: 0.7 MG/DL (ref 0.3–1.2)
BUN BLD-MCNC: 12 MG/DL (ref 9–23)
BUN/CREAT SERPL: 9.3 (ref 10–20)
CALCIUM BLD-MCNC: 9.7 MG/DL (ref 8.7–10.4)
CHLORIDE SERPL-SCNC: 105 MMOL/L (ref 98–112)
CHOLEST SERPL-MCNC: 162 MG/DL (ref ?–200)
CO2 SERPL-SCNC: 31 MMOL/L (ref 21–32)
CREAT BLD-MCNC: 1.29 MG/DL
DEPRECATED HBV CORE AB SER IA-ACNC: 167.1 NG/ML
DEPRECATED RDW RBC AUTO: 38.9 FL (ref 35.1–46.3)
EGFRCR SERPLBLD CKD-EPI 2021: 71 ML/MIN/1.73M2 (ref 60–?)
EOSINOPHIL # BLD AUTO: 0.06 X10(3) UL (ref 0–0.7)
EOSINOPHIL NFR BLD AUTO: 1.1 %
ERYTHROCYTE [DISTWIDTH] IN BLOOD BY AUTOMATED COUNT: 12 % (ref 11–15)
FASTING PATIENT LIPID ANSWER: YES
FASTING STATUS PATIENT QL REPORTED: YES
FOLATE SERPL-MCNC: 22.7 NG/ML (ref 5.4–?)
GLOBULIN PLAS-MCNC: 3.3 G/DL (ref 2–3.5)
GLUCOSE BLD-MCNC: 90 MG/DL (ref 70–99)
HCT VFR BLD AUTO: 48.7 %
HDLC SERPL-MCNC: 42 MG/DL (ref 40–59)
HGB BLD-MCNC: 15.8 G/DL
IMM GRANULOCYTES # BLD AUTO: 0.01 X10(3) UL (ref 0–1)
IMM GRANULOCYTES NFR BLD: 0.2 %
IRON SATN MFR SERPL: 29 %
IRON SERPL-MCNC: 79 UG/DL
LDLC SERPL CALC-MCNC: 103 MG/DL (ref ?–100)
LYMPHOCYTES # BLD AUTO: 1.06 X10(3) UL (ref 1–4)
LYMPHOCYTES NFR BLD AUTO: 20.2 %
MCH RBC QN AUTO: 28.6 PG (ref 26–34)
MCHC RBC AUTO-ENTMCNC: 32.4 G/DL (ref 31–37)
MCV RBC AUTO: 88.1 FL
MONOCYTES # BLD AUTO: 0.46 X10(3) UL (ref 0.1–1)
MONOCYTES NFR BLD AUTO: 8.8 %
NEUTROPHILS # BLD AUTO: 3.63 X10 (3) UL (ref 1.5–7.7)
NEUTROPHILS # BLD AUTO: 3.63 X10(3) UL (ref 1.5–7.7)
NEUTROPHILS NFR BLD AUTO: 69.3 %
NONHDLC SERPL-MCNC: 120 MG/DL (ref ?–130)
OSMOLALITY SERPL CALC.SUM OF ELEC: 295 MOSM/KG (ref 275–295)
PLATELET # BLD AUTO: 200 10(3)UL (ref 150–450)
POTASSIUM SERPL-SCNC: 3.9 MMOL/L (ref 3.5–5.1)
PROT SERPL-MCNC: 7.9 G/DL (ref 5.7–8.2)
RBC # BLD AUTO: 5.53 X10(6)UL
SODIUM SERPL-SCNC: 143 MMOL/L (ref 136–145)
TIBC SERPL-MCNC: 276 UG/DL (ref 250–425)
TRANSFERRIN SERPL-MCNC: 185 MG/DL (ref 215–365)
TRIGL SERPL-MCNC: 92 MG/DL (ref 30–149)
TSI SER-ACNC: 2.05 MIU/ML (ref 0.55–4.78)
VIT B12 SERPL-MCNC: 309 PG/ML (ref 211–911)
VIT D+METAB SERPL-MCNC: 16.4 NG/ML (ref 30–100)
VLDLC SERPL CALC-MCNC: 15 MG/DL (ref 0–30)
WBC # BLD AUTO: 5.2 X10(3) UL (ref 4–11)

## 2024-07-15 PROCEDURE — 85025 COMPLETE CBC W/AUTO DIFF WBC: CPT

## 2024-07-15 PROCEDURE — 84443 ASSAY THYROID STIM HORMONE: CPT

## 2024-07-15 PROCEDURE — 82607 VITAMIN B-12: CPT

## 2024-07-15 PROCEDURE — 36415 COLL VENOUS BLD VENIPUNCTURE: CPT

## 2024-07-15 PROCEDURE — 82728 ASSAY OF FERRITIN: CPT

## 2024-07-15 PROCEDURE — 82306 VITAMIN D 25 HYDROXY: CPT

## 2024-07-15 PROCEDURE — 83540 ASSAY OF IRON: CPT

## 2024-07-15 PROCEDURE — 82746 ASSAY OF FOLIC ACID SERUM: CPT

## 2024-07-15 PROCEDURE — 80061 LIPID PANEL: CPT

## 2024-07-15 PROCEDURE — 84466 ASSAY OF TRANSFERRIN: CPT

## 2024-07-15 PROCEDURE — 80053 COMPREHEN METABOLIC PANEL: CPT

## 2024-08-26 RX ORDER — HYDROCORTISONE 2.5 %
CREAM (GRAM) TOPICAL
Qty: 20 G | Refills: 0 | Status: SHIPPED | OUTPATIENT
Start: 2024-08-26

## 2024-09-06 ENCOUNTER — HOSPITAL ENCOUNTER (OUTPATIENT)
Dept: CT IMAGING | Age: 44
Discharge: HOME | End: 2024-09-06
Attending: INTERNAL MEDICINE

## 2024-09-06 DIAGNOSIS — Z13.6 SCREENING FOR HEART DISEASE: ICD-10-CM

## 2024-09-06 NOTE — PROGRESS NOTES
Date of Service 9/6/2024    JASMYNE MUHAMMAD  Date of Birth 12/4/1980    Patient Age: 43 year old    PCP: Polo Rdz MD  [address] [place] [address]    Heart Scan Consult  Preliminary Heart Scan Score: 0    Previous Screening  Heart Scan Completed Previously: No        Peripheral Vascular Scan Completed Previously: No          Risk Factors  Personal Risk Factors  Non-alterable Risk Factors: Personal History;Age;Gender;Family History  Alterable Risk Factors: Abnormal Cholesterol;Unhealthy eating          Blood Pressure  There were no vitals taken for this visit.  (Normal =< 120/80,  Elevated = 120-129/ >80,  High Stage1 130-139/80-89 , Stage2 >140/>90)    Lipid Profile  Cholesterol: 162, done on 7/15/2024.  HDL Cholesterol: 42, done on 7/15/2024.  LDL Cholesterol: 103, done on 7/15/2024.  TriGlycerides 92, done on 7/15/2024.    Cholesterol Goals  Value   Total  =< 200   HDL  = > 45 Men = > 55 Women   LDL   =< 100   Triglycerides  =< 150       Glucose and Hemoglobin A1C  Lab Results   Component Value Date    GLU 90 07/15/2024     (Normal Fasting Glucose < 100mg/dl )    Nurse Review  Risk factor information and results reviewed with Nurse: Yes    Recommended Follow Up:  Consult your physician regarding::   Final Heart Scan Report;  Discuss potential for Incidental Finding;  Discuss Potential for Score Variance      Recommendations for Change:  Nutrition Changes: Low Saturated Fat;Low Fat Dairy;Increase Fiber    Cholesterol Modification (goal of therapy depends upon your risk):   Increase HDL (Healthy/Good) Normal >45 Men >55 Women;  Decrease LDL (Lousy/Bad) Ideal <100;  Decrease Triglycerides (Ugly) Normal <150     (Today's NON-FASTING Cholestech Values:  Total Cholesterol-151, HDL-37, LDL-85, Triglycerides-142, Glucose-96)    Exercise: Enhance Current Program                   Repeat Heart Scan:   5 years if Calcium Score is 0.0              [place] Recommended Resources:  Recommended Resources: Upcoming  Classes, Medical Services and Health Library [email or web address];    PV Screening  Recommended PV Screening: Carotids;Abdomen;Ankle-Brachial Index (GUILHERME)      Other Resources:: Educational handouts provided.      Deja FERRO RN        Please Contact the Nurse Heart Line with any Questions or Concerns [phone].

## 2024-11-21 ENCOUNTER — TELEPHONE (OUTPATIENT)
Dept: INTERNAL MEDICINE CLINIC | Facility: CLINIC | Age: 44
End: 2024-11-21

## 2024-11-21 RX ORDER — TOBRAMYCIN/DEXAMETHASONE 0.3 %-0.1%
OINTMENT (GRAM) OPHTHALMIC (EYE)
Qty: 1 EACH | Refills: 0 | Status: SHIPPED | OUTPATIENT
Start: 2024-11-21 | End: 2024-11-21

## 2024-11-21 NOTE — TELEPHONE ENCOUNTER
Dr. Rdz please advise -    Pharmacy is requesting the prescription for tobramycin-dexamethasone be sent as generic with no brand name attached.     Patient's insurance is not covering, due to brand name being attached.    Pended for tobramycin-dexamethasone 0.3 - 0.1% OP Ointment.    Previously written as tobramycin-dexamethasone (TOBRADEX) 0.3-0.1% ophthalmic ointment.     - Original prescription has been canceled from the pharmacy.    Please review and approve, if appropriate to do so.    Requested Prescriptions     Pending Prescriptions Disp Refills    tobramycin-dexamethasone 0.3-0.1 % Ophthalmic Ointment 3.5 g 0     Sig: APPLY THE OINTMENT 3 TIMES DAILY ON THE AFFECTED EYELID.

## 2024-11-21 NOTE — TELEPHONE ENCOUNTER
Drug change requested - not covered by pt plan.  The preferred alternative is TOBRAMYCINDEXAMETHASO,          tobramycin-dexamethasone (TOBRADEX) 0.3-0.1 % Ophthalmic Ointment, Apply the ointment 3 times daily on affected area of the eyelid, Disp: 1 each, Rfl: 0

## 2024-11-21 NOTE — TELEPHONE ENCOUNTER
Spoke to patient patient has small inflamed , tender area of the left eyelid skin tag   which is mildly red and painful getting worse this morning  On exam tenderness and small localized erythema of the base area of the eyelid skin tag.       Tobramycin dexamethasone ointment small application   3 times daily  for 2-3 days or longer if needed  - sent to the pharmacy   keep the area clean , can use  warm compress   2-3 times daily for few days - if any persistent worsening symptoms let me know  .

## 2024-11-22 ENCOUNTER — TELEPHONE (OUTPATIENT)
Dept: INTERNAL MEDICINE CLINIC | Facility: CLINIC | Age: 44
End: 2024-11-22

## 2024-11-22 RX ORDER — DOXYCYCLINE HYCLATE 100 MG
100 TABLET ORAL 2 TIMES DAILY
Qty: 14 TABLET | Refills: 0 | Status: SHIPPED | OUTPATIENT
Start: 2024-11-22 | End: 2025-01-23 | Stop reason: ALTCHOICE

## 2024-11-22 NOTE — TELEPHONE ENCOUNTER
Nunu, pharmacist - [facility] called, verified patient's Name and . States prescription for tobramycin-dexamethasone ointment is still not covered by patient's insurance, and that what is covered only is the tobramycin drops or solution.     Dr. Rdz please advise.

## 2024-11-23 NOTE — TELEPHONE ENCOUNTER
Current Outpatient Medications:     tobramycin-dexamethasone 0.3-0.1 % Ophthalmic Ointment, APPLY THE OINTMENT 3 TIMES DAILY ON THE AFFECTED EYELID., Disp: 3.5 g, Rfl: 0

## 2024-11-27 NOTE — TELEPHONE ENCOUNTER
Duplicate request, previously addressed.       Disp Refills Start End    tobramycin-dexamethasone 0.3-0.1 % Ophthalmic Ointment 3.5 g 0 11/21/2024 --    Sig: APPLY THE OINTMENT 3 TIMES DAILY ON THE AFFECTED EYELID.    Sent to pharmacy as: Tobramycin-dexAMETHasone 0.3-0.1 % Ophthalmic Ointment (Tobradex)    E-Prescribing Status: Receipt confirmed by pharmacy (11/21/2024  5:39 PM CST)      Pharmacy    [id number] - [facility] [address] [facility] [place], [phone], [phone]

## 2025-01-23 ENCOUNTER — OFFICE VISIT (OUTPATIENT)
Dept: FAMILY MEDICINE CLINIC | Facility: CLINIC | Age: 45
End: 2025-01-23

## 2025-01-23 VITALS
HEART RATE: 106 BPM | HEIGHT: 70 IN | DIASTOLIC BLOOD PRESSURE: 82 MMHG | BODY MASS INDEX: 22.48 KG/M2 | WEIGHT: 157 LBS | SYSTOLIC BLOOD PRESSURE: 122 MMHG

## 2025-01-23 DIAGNOSIS — J02.9 SORE THROAT: Primary | ICD-10-CM

## 2025-01-23 DIAGNOSIS — J39.9 UPPER RESPIRATORY DISEASE: ICD-10-CM

## 2025-01-23 PROBLEM — R42 LIGHTHEADEDNESS: Status: RESOLVED | Noted: 2024-04-04 | Resolved: 2025-01-23

## 2025-01-23 PROBLEM — R42 DIZZINESS: Status: RESOLVED | Noted: 2018-12-20 | Resolved: 2025-01-23

## 2025-01-23 PROBLEM — S16.1XXA CERVICAL STRAIN: Status: RESOLVED | Noted: 2018-05-17 | Resolved: 2025-01-23

## 2025-01-23 PROBLEM — R53.83 OTHER FATIGUE: Status: RESOLVED | Noted: 2024-07-12 | Resolved: 2025-01-23

## 2025-01-23 LAB
CONTROL LINE PRESENT WITH A CLEAR BACKGROUND (YES/NO): YES YES/NO
KIT LOT #: NORMAL NUMERIC
STREP GRP A CUL-SCR: NEGATIVE

## 2025-01-23 PROCEDURE — 99212 OFFICE O/P EST SF 10 MIN: CPT | Performed by: PHYSICIAN ASSISTANT

## 2025-01-23 PROCEDURE — 87880 STREP A ASSAY W/OPTIC: CPT | Performed by: PHYSICIAN ASSISTANT

## 2025-01-23 NOTE — PROGRESS NOTES
HPI:     Sore Throat   This is a new problem. The current episode started today. The problem has been unchanged. There has been no fever. Associated symptoms include coughing and headaches. Pertinent negatives include no abdominal pain, congestion, diarrhea, ear discharge, ear pain, hoarse voice, shortness of breath or vomiting.         Medications:     No current outpatient medications on file.       Allergies:   Allergies[1]    History:     Health Maintenance   Topic Date Due    COVID-19 Vaccine (5 - 2024-25 season) 09/01/2024    Annual Depression Screening  01/01/2025    Annual Physical  07/12/2025    DTaP,Tdap,and Td Vaccines (3 - Td or Tdap) 09/17/2031    Influenza Vaccine  Completed    Pneumococcal Vaccine: Birth to 50yrs  Aged Out    Meningococcal B Vaccine  Aged Out       No LMP for male patient.   Past Medical History:     Past Medical History:    Vitamin D deficiency       Past Surgical History:     Past Surgical History:   Procedure Laterality Date    Ep study  1/8/2019         Other  May 2011    I&D right buttock abscess    Other  July 2011    Excision epidermoid cyst right buttock       Family History:     Family History   Problem Relation Age of Onset    Other (Hypercholesterolemia) Father     Other (Other) Father         lymphoplastmacytic lymphoma    Melanoma Sister     Diabetes Maternal Grandmother     Hypertension Maternal Grandfather     Other (Atrial Fibrillation) Maternal Grandfather     Heart Disease Paternal Grandmother         Stent age 80s    Diabetes Paternal Grandmother     Hypertension Paternal Grandfather     Diabetes Paternal Aunt        Social History:     Social History     Socioeconomic History    Marital status: Single     Spouse name: Not on file    Number of children: Not on file    Years of education: Not on file    Highest education level: Not on file   Occupational History    Not on file   Tobacco Use    Smoking status: Never    Smokeless tobacco: Never   Vaping Use    Vaping  status: Never Used   Substance and Sexual Activity    Alcohol use: No    Drug use: No    Sexual activity: Not Currently     Partners: Female   Other Topics Concern     Service Not Asked    Blood Transfusions Not Asked    Caffeine Concern No    Occupational Exposure Not Asked    Hobby Hazards Not Asked    Sleep Concern Not Asked    Stress Concern Not Asked    Weight Concern Not Asked    Special Diet Not Asked    Back Care Not Asked    Exercise Not Asked    Bike Helmet Not Asked    Seat Belt Not Asked    Self-Exams Not Asked    Grew up on a farm Not Asked    History of tanning Yes    Outdoor occupation No    Reaction to local anesthetic No    Pt has a pacemaker No    Pt has a defibrillator No   Social History Narrative    Not on file     Social Drivers of Health     Financial Resource Strain: Not on file   Food Insecurity: Not on file   Transportation Needs: Not on file   Physical Activity: Not on file   Stress: Not on file   Social Connections: Not on file   Housing Stability: Not on file       Review of Systems:   Review of Systems   Constitutional:  Positive for chills and fatigue. Negative for activity change and fever.   HENT:  Positive for rhinorrhea and sore throat. Negative for congestion, ear discharge, ear pain, hoarse voice, postnasal drip, sinus pressure and sinus pain.    Respiratory:  Positive for cough. Negative for chest tightness, shortness of breath and wheezing.    Cardiovascular:  Negative for chest pain and palpitations.   Gastrointestinal:  Negative for abdominal distention, abdominal pain, blood in stool, constipation, diarrhea, nausea and vomiting.   Skin:  Negative for rash.   Neurological:  Positive for headaches.        Vitals:    01/23/25 1338   BP: 122/82   Pulse: 106   Weight: 157 lb (71.2 kg)   Height: 5' 10\" (1.778 m)     Body mass index is 22.53 kg/m².    Physical Exam:   Physical Exam  Vitals reviewed.   Constitutional:       General: He is not in acute distress.      Appearance: He is well-developed.   HENT:      Right Ear: Tympanic membrane, ear canal and external ear normal. There is no impacted cerumen.      Left Ear: Tympanic membrane, ear canal and external ear normal. There is no impacted cerumen.      Nose: Nose normal.      Mouth/Throat:      Mouth: Mucous membranes are moist.      Pharynx: Oropharynx is clear. No oropharyngeal exudate or posterior oropharyngeal erythema.   Eyes:      General:         Right eye: No discharge.         Left eye: No discharge.      Conjunctiva/sclera: Conjunctivae normal.   Cardiovascular:      Rate and Rhythm: Normal rate and regular rhythm.      Heart sounds: Normal heart sounds, S1 normal and S2 normal. No murmur heard.  Pulmonary:      Effort: Pulmonary effort is normal.      Breath sounds: Normal breath sounds. No wheezing or rales.   Chest:      Chest wall: No tenderness.   Lymphadenopathy:      Cervical: No cervical adenopathy.   Skin:     Findings: No rash.   Neurological:      Mental Status: He is alert and oriented to person, place, and time.   Psychiatric:         Behavior: Behavior is cooperative.          Assessment and Plan::     Assessment & Plan  Sore throat    Orders:    POC Rapid Strep [[id number]]  Supportive care includes:    encourage fluid intake   Advise patient to take Tylenol/Ibuprofen as needed for pain.  warm salt water gargles   humidifier     Upper respiratory disease  Advise the patient to check Flu/Covid test. Will contact the office if positive.          Discussed plan of care with pt and pt is in agreement.All questions answered. Pt to call with questions or concerns.         [1]   Allergies  Allergen Reactions    Pseudoephedrine OTHER (SEE COMMENTS)     Tachycardia

## 2025-07-07 ENCOUNTER — MED REC SCAN ONLY (OUTPATIENT)
Dept: FAMILY MEDICINE CLINIC | Facility: CLINIC | Age: 45
End: 2025-07-07

## 2025-07-07 ENCOUNTER — TELEPHONE (OUTPATIENT)
Dept: FAMILY MEDICINE CLINIC | Facility: CLINIC | Age: 45
End: 2025-07-07

## 2025-07-07 NOTE — TELEPHONE ENCOUNTER
Received note from [facility], will leave for  to review  Okay with Terrell to access his chart

## (undated) NOTE — LETTER
Mary Carmen Perry, [address] [place]       05/16/19        Patient: Josse Woo   YOB: 1980   Date of Visit: 5/16/2019       Dear  Dr. Trixie Guerrier MD,      Thank you for referring Josse Woo to my practice.   Please find my a

## (undated) NOTE — LETTER
[facility], [place]  Authorization for Invasive Procedures  1.  I hereby authorize  Dr Nelly Scott , my physician and whomever may be designated as the doctor's assistant, to perform the following operation and/or procedure: Electrophysi 5. I consent to the photographing of the operations or procedures to be performed for the purposes of advancing medicine, science, and/or education, provided my identity is not revealed.  If the procedure has been videotaped, the physician/surgeon will obta __________ Time: ___________    Statement of Physician  My signature below affirms that prior to the time of the procedure, I have explained to the patient and/or his legal representative, the risks and benefits involved in the proposed treatment and any r